# Patient Record
Sex: FEMALE | Race: WHITE | NOT HISPANIC OR LATINO | Employment: FULL TIME | URBAN - NONMETROPOLITAN AREA
[De-identification: names, ages, dates, MRNs, and addresses within clinical notes are randomized per-mention and may not be internally consistent; named-entity substitution may affect disease eponyms.]

---

## 2022-02-21 ENCOUNTER — APPOINTMENT (OUTPATIENT)
Dept: CT IMAGING | Facility: HOSPITAL | Age: 47
End: 2022-02-21

## 2022-02-21 ENCOUNTER — HOSPITAL ENCOUNTER (EMERGENCY)
Facility: HOSPITAL | Age: 47
Discharge: HOME | End: 2022-02-21
Attending: STUDENT IN AN ORGANIZED HEALTH CARE EDUCATION/TRAINING PROGRAM | Admitting: STUDENT IN AN ORGANIZED HEALTH CARE EDUCATION/TRAINING PROGRAM

## 2022-02-21 ENCOUNTER — APPOINTMENT (OUTPATIENT)
Dept: GENERAL RADIOLOGY | Facility: HOSPITAL | Age: 47
End: 2022-02-21

## 2022-02-21 DIAGNOSIS — R55 SYNCOPE AND COLLAPSE: Primary | ICD-10-CM

## 2022-02-21 DIAGNOSIS — S01.112A LACERATION OF LEFT EYEBROW, INITIAL ENCOUNTER: ICD-10-CM

## 2022-02-21 LAB
ALBUMIN SERPL-MCNC: 4.02 G/DL (ref 3.5–5.2)
ALBUMIN/GLOB SERPL: 1.4 G/DL
ALP SERPL-CCNC: 46 U/L (ref 39–117)
ALT SERPL W P-5'-P-CCNC: 9 U/L (ref 1–33)
ANION GAP SERPL CALCULATED.3IONS-SCNC: 10.5 MMOL/L (ref 5–15)
AST SERPL-CCNC: 14 U/L (ref 1–32)
B-HCG UR QL: NEGATIVE
BACTERIA UR QL AUTO: ABNORMAL /HPF
BASOPHILS # BLD AUTO: 0.05 10*3/MM3 (ref 0–0.2)
BASOPHILS NFR BLD AUTO: 0.5 % (ref 0–1.5)
BILIRUB SERPL-MCNC: 0.2 MG/DL (ref 0–1.2)
BILIRUB UR QL STRIP: NEGATIVE
BUN SERPL-MCNC: 7 MG/DL (ref 6–20)
BUN/CREAT SERPL: 9.5 (ref 7–25)
CALCIUM SPEC-SCNC: 8.7 MG/DL (ref 8.6–10.5)
CHLORIDE SERPL-SCNC: 105 MMOL/L (ref 98–107)
CLARITY UR: ABNORMAL
CO2 SERPL-SCNC: 23.5 MMOL/L (ref 22–29)
COLOR UR: YELLOW
CREAT SERPL-MCNC: 0.74 MG/DL (ref 0.57–1)
D DIMER PPP FEU-MCNC: 0.31 MCGFEU/ML (ref 0–0.5)
DEPRECATED RDW RBC AUTO: 44.5 FL (ref 37–54)
EOSINOPHIL # BLD AUTO: 0.51 10*3/MM3 (ref 0–0.4)
EOSINOPHIL NFR BLD AUTO: 4.9 % (ref 0.3–6.2)
ERYTHROCYTE [DISTWIDTH] IN BLOOD BY AUTOMATED COUNT: 13 % (ref 12.3–15.4)
FLUAV RNA RESP QL NAA+PROBE: NOT DETECTED
FLUBV RNA RESP QL NAA+PROBE: NOT DETECTED
GFR SERPL CREATININE-BSD FRML MDRD: 85 ML/MIN/1.73
GLOBULIN UR ELPH-MCNC: 2.8 GM/DL
GLUCOSE SERPL-MCNC: 94 MG/DL (ref 65–99)
GLUCOSE UR STRIP-MCNC: NEGATIVE MG/DL
HCT VFR BLD AUTO: 41.8 % (ref 34–46.6)
HGB BLD-MCNC: 13.8 G/DL (ref 12–15.9)
HGB UR QL STRIP.AUTO: ABNORMAL
HYALINE CASTS UR QL AUTO: ABNORMAL /LPF
IMM GRANULOCYTES # BLD AUTO: 0.03 10*3/MM3 (ref 0–0.05)
IMM GRANULOCYTES NFR BLD AUTO: 0.3 % (ref 0–0.5)
KETONES UR QL STRIP: NEGATIVE
LEUKOCYTE ESTERASE UR QL STRIP.AUTO: ABNORMAL
LYMPHOCYTES # BLD AUTO: 2.73 10*3/MM3 (ref 0.7–3.1)
LYMPHOCYTES NFR BLD AUTO: 26.5 % (ref 19.6–45.3)
MCH RBC QN AUTO: 30.6 PG (ref 26.6–33)
MCHC RBC AUTO-ENTMCNC: 33 G/DL (ref 31.5–35.7)
MCV RBC AUTO: 92.7 FL (ref 79–97)
MONOCYTES # BLD AUTO: 0.6 10*3/MM3 (ref 0.1–0.9)
MONOCYTES NFR BLD AUTO: 5.8 % (ref 5–12)
NEUTROPHILS NFR BLD AUTO: 6.4 10*3/MM3 (ref 1.7–7)
NEUTROPHILS NFR BLD AUTO: 62 % (ref 42.7–76)
NITRITE UR QL STRIP: NEGATIVE
NRBC BLD AUTO-RTO: 0 /100 WBC (ref 0–0.2)
NT-PROBNP SERPL-MCNC: 63.4 PG/ML (ref 0–450)
PH UR STRIP.AUTO: 7 [PH] (ref 5–8)
PLATELET # BLD AUTO: 301 10*3/MM3 (ref 140–450)
PMV BLD AUTO: 10.7 FL (ref 6–12)
POTASSIUM SERPL-SCNC: 4 MMOL/L (ref 3.5–5.2)
PROT SERPL-MCNC: 6.8 G/DL (ref 6–8.5)
PROT UR QL STRIP: NEGATIVE
RBC # BLD AUTO: 4.51 10*6/MM3 (ref 3.77–5.28)
RBC # UR STRIP: ABNORMAL /HPF
REF LAB TEST METHOD: ABNORMAL
SARS-COV-2 RNA RESP QL NAA+PROBE: NOT DETECTED
SODIUM SERPL-SCNC: 139 MMOL/L (ref 136–145)
SP GR UR STRIP: 1.01 (ref 1–1.03)
SQUAMOUS #/AREA URNS HPF: ABNORMAL /HPF
TROPONIN T SERPL-MCNC: <0.01 NG/ML (ref 0–0.03)
TROPONIN T SERPL-MCNC: <0.01 NG/ML (ref 0–0.03)
UROBILINOGEN UR QL STRIP: ABNORMAL
WBC # UR STRIP: ABNORMAL /HPF
WBC NRBC COR # BLD: 10.32 10*3/MM3 (ref 3.4–10.8)

## 2022-02-21 PROCEDURE — 87636 SARSCOV2 & INF A&B AMP PRB: CPT | Performed by: PHYSICIAN ASSISTANT

## 2022-02-21 PROCEDURE — 93010 ELECTROCARDIOGRAM REPORT: CPT | Performed by: INTERNAL MEDICINE

## 2022-02-21 PROCEDURE — 36415 COLL VENOUS BLD VENIPUNCTURE: CPT

## 2022-02-21 PROCEDURE — 72125 CT NECK SPINE W/O DYE: CPT

## 2022-02-21 PROCEDURE — 70486 CT MAXILLOFACIAL W/O DYE: CPT

## 2022-02-21 PROCEDURE — 99283 EMERGENCY DEPT VISIT LOW MDM: CPT

## 2022-02-21 PROCEDURE — 80053 COMPREHEN METABOLIC PANEL: CPT | Performed by: PHYSICIAN ASSISTANT

## 2022-02-21 PROCEDURE — 81025 URINE PREGNANCY TEST: CPT | Performed by: PHYSICIAN ASSISTANT

## 2022-02-21 PROCEDURE — 93005 ELECTROCARDIOGRAM TRACING: CPT | Performed by: EMERGENCY MEDICINE

## 2022-02-21 PROCEDURE — 83880 ASSAY OF NATRIURETIC PEPTIDE: CPT | Performed by: PHYSICIAN ASSISTANT

## 2022-02-21 PROCEDURE — 87086 URINE CULTURE/COLONY COUNT: CPT | Performed by: PHYSICIAN ASSISTANT

## 2022-02-21 PROCEDURE — 70450 CT HEAD/BRAIN W/O DYE: CPT

## 2022-02-21 PROCEDURE — 84484 ASSAY OF TROPONIN QUANT: CPT | Performed by: PHYSICIAN ASSISTANT

## 2022-02-21 PROCEDURE — 85025 COMPLETE CBC W/AUTO DIFF WBC: CPT | Performed by: PHYSICIAN ASSISTANT

## 2022-02-21 PROCEDURE — 71045 X-RAY EXAM CHEST 1 VIEW: CPT | Performed by: RADIOLOGY

## 2022-02-21 PROCEDURE — 71045 X-RAY EXAM CHEST 1 VIEW: CPT

## 2022-02-21 PROCEDURE — 85379 FIBRIN DEGRADATION QUANT: CPT | Performed by: PHYSICIAN ASSISTANT

## 2022-02-21 PROCEDURE — 93005 ELECTROCARDIOGRAM TRACING: CPT | Performed by: STUDENT IN AN ORGANIZED HEALTH CARE EDUCATION/TRAINING PROGRAM

## 2022-02-21 PROCEDURE — 81001 URINALYSIS AUTO W/SCOPE: CPT | Performed by: PHYSICIAN ASSISTANT

## 2022-02-21 RX ORDER — CEPHALEXIN 500 MG/1
500 CAPSULE ORAL 2 TIMES DAILY
Qty: 20 CAPSULE | Refills: 0 | Status: SHIPPED | OUTPATIENT
Start: 2022-02-21 | End: 2023-02-13

## 2022-02-22 VITALS
OXYGEN SATURATION: 99 % | BODY MASS INDEX: 33.34 KG/M2 | TEMPERATURE: 98.4 F | RESPIRATION RATE: 18 BRPM | SYSTOLIC BLOOD PRESSURE: 131 MMHG | HEART RATE: 77 BPM | HEIGHT: 68 IN | DIASTOLIC BLOOD PRESSURE: 83 MMHG | WEIGHT: 220 LBS

## 2022-02-22 LAB
BACTERIA SPEC AEROBE CULT: NORMAL
QT INTERVAL: 374 MS
QTC INTERVAL: 434 MS

## 2022-02-22 NOTE — ED PROVIDER NOTES
Subjective     History provided by:  Patient  Syncope  Episode history:  Single  Most recent episode:  Yesterday  Timing:  Rare  Progression:  Improving  Chronicity:  New  Context: sitting down and standing up    Witnessed: yes    Relieved by:  Sitting up  Worsened by:  Nothing  Ineffective treatments:  None tried  Associated symptoms: no chest pain and no fever        Review of Systems   Constitutional: Negative.  Negative for fever.   HENT: Negative.    Respiratory: Negative.    Cardiovascular: Positive for syncope. Negative for chest pain.   Gastrointestinal: Negative.  Negative for abdominal pain.   Endocrine: Negative.    Genitourinary: Negative.  Negative for dysuria.   Skin: Positive for wound.   Neurological: Positive for syncope and light-headedness.   Psychiatric/Behavioral: Negative.    All other systems reviewed and are negative.      No past medical history on file.    Allergies   Allergen Reactions   • Ibuprofen Anaphylaxis       No past surgical history on file.    No family history on file.    Social History     Socioeconomic History   • Marital status: Single           Objective   Physical Exam  Vitals and nursing note reviewed.   Constitutional:       General: She is not in acute distress.     Appearance: She is well-developed. She is not diaphoretic.   HENT:      Head: Normocephalic.      Comments: Laceration to the left eyebrow.  Laceration is greater than 8 hours old, and has closed quite well.       Right Ear: External ear normal.      Left Ear: External ear normal.      Nose: Nose normal.   Eyes:      Extraocular Movements: Extraocular movements intact.      Conjunctiva/sclera: Conjunctivae normal.      Pupils: Pupils are equal, round, and reactive to light.   Neck:      Vascular: No JVD.      Trachea: No tracheal deviation.   Cardiovascular:      Rate and Rhythm: Normal rate and regular rhythm.      Heart sounds: Normal heart sounds. No murmur heard.      Pulmonary:      Effort: Pulmonary  effort is normal. No respiratory distress.      Breath sounds: Normal breath sounds. No wheezing.   Abdominal:      Palpations: Abdomen is soft.      Tenderness: There is no abdominal tenderness.   Musculoskeletal:         General: No deformity. Normal range of motion.      Cervical back: Normal range of motion and neck supple.   Skin:     General: Skin is warm and dry.      Coloration: Skin is not pale.      Findings: No erythema or rash.   Neurological:      Mental Status: She is alert and oriented to person, place, and time.      Cranial Nerves: No cranial nerve deficit.   Psychiatric:         Behavior: Behavior normal.         Thought Content: Thought content normal.         Procedures           ED Course  ED Course as of 02/21/22 2343   Mon Feb 21, 2022   2147 CT head rad interpreted:  .  No acute intracranial abnormality.  2.  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 [RB]   2215 XR chest rad interpreted:  No evidence of active or acute cardiopulmonary disease on today's chest  radiograph. [RB]   2341 CT cervical spine / facial bones rad interpreted:  1.  Minimal nasal bone deformity is age indeterminate.  2.  Variable states of dental decay.  3.  No acute bony abnormality in the cervical spine.    [RB]      ED Course User Index  [RB] Jeremie Van II, PA                                                 MDM  Number of Diagnoses or Management Options  Laceration of left eyebrow, initial encounter: new and requires workup  Syncope and collapse: new and requires workup     Amount and/or Complexity of Data Reviewed  Clinical lab tests: ordered and reviewed  Tests in the radiology section of CPT®: ordered and reviewed    Risk of Complications, Morbidity, and/or Mortality  Presenting problems: moderate  Diagnostic procedures: moderate  Management options: low    Patient Progress  Patient progress: stable      Final diagnoses:   Syncope and collapse    Laceration of left eyebrow, initial encounter       ED Disposition  ED Disposition     ED Disposition Condition Comment    Discharge Stable           Zachary Mckeon, APRN  [address] [place], [place] [address]  [phone]    Schedule an appointment as soon as possible for a visit            Medication List      New Prescriptions    cephalexin 500 MG capsule  Commonly known as: KEFLEX  Take 1 capsule by mouth 2 (Two) Times a Day.           Where to Get Your Medications      You can get these medications from any pharmacy    Bring a paper prescription for each of these medications  · cephalexin 500 MG capsule          Jeremie Van II, PA  02/21/22 8431

## 2022-02-22 NOTE — ED NOTES
Pt reassessed at this time, pt has no new complaints. Apologized to pt for wait times. Pt has NADN at this time. Will continue to monitor pt.        Annabella Joseph RN  02/21/22 [id number]

## 2022-04-27 ENCOUNTER — TRANSCRIBE ORDERS (OUTPATIENT)
Dept: ADMINISTRATIVE | Facility: HOSPITAL | Age: 47
End: 2022-04-27

## 2022-04-27 ENCOUNTER — HOSPITAL ENCOUNTER (OUTPATIENT)
Dept: GENERAL RADIOLOGY | Facility: HOSPITAL | Age: 47
Discharge: HOME | End: 2022-04-27

## 2022-04-27 DIAGNOSIS — M25.571 RIGHT ANKLE PAIN, UNSPECIFIED CHRONICITY: ICD-10-CM

## 2022-04-27 DIAGNOSIS — M79.671 RIGHT FOOT PAIN: ICD-10-CM

## 2022-04-27 DIAGNOSIS — M25.571 RIGHT ANKLE PAIN, UNSPECIFIED CHRONICITY: Primary | ICD-10-CM

## 2022-04-27 PROCEDURE — 73630 X-RAY EXAM OF FOOT: CPT

## 2022-04-27 PROCEDURE — 73630 X-RAY EXAM OF FOOT: CPT | Performed by: RADIOLOGY

## 2022-04-27 PROCEDURE — 73610 X-RAY EXAM OF ANKLE: CPT

## 2022-04-27 PROCEDURE — 73610 X-RAY EXAM OF ANKLE: CPT | Performed by: RADIOLOGY

## 2022-10-18 ENCOUNTER — TRANSCRIBE ORDERS (OUTPATIENT)
Dept: ADMINISTRATIVE | Facility: HOSPITAL | Age: 47
End: 2022-10-18

## 2022-10-18 DIAGNOSIS — E04.0 NONTOXIC (DIFFUSE) GOITER: ICD-10-CM

## 2022-10-18 DIAGNOSIS — R10.11 ABDOMINAL PAIN, RIGHT UPPER QUADRANT: Primary | ICD-10-CM

## 2022-10-26 ENCOUNTER — HOSPITAL ENCOUNTER (OUTPATIENT)
Dept: ULTRASOUND IMAGING | Facility: HOSPITAL | Age: 47
Discharge: HOME | End: 2022-10-26

## 2022-10-26 ENCOUNTER — TRANSCRIBE ORDERS (OUTPATIENT)
Dept: ADMINISTRATIVE | Facility: HOSPITAL | Age: 47
End: 2022-10-26

## 2022-10-26 ENCOUNTER — LAB (OUTPATIENT)
Dept: LAB | Facility: HOSPITAL | Age: 47
End: 2022-10-26

## 2022-10-26 DIAGNOSIS — R55 SYNCOPE AND COLLAPSE: Primary | ICD-10-CM

## 2022-10-26 DIAGNOSIS — R10.11 ABDOMINAL PAIN, RIGHT UPPER QUADRANT: ICD-10-CM

## 2022-10-26 DIAGNOSIS — E04.0 NONTOXIC (DIFFUSE) GOITER: ICD-10-CM

## 2022-10-26 DIAGNOSIS — R55 SYNCOPE AND COLLAPSE: ICD-10-CM

## 2022-10-26 LAB
ALBUMIN SERPL-MCNC: 4.1 G/DL (ref 3.5–5.2)
ALBUMIN/GLOB SERPL: 1.7 G/DL
ALP SERPL-CCNC: 44 U/L (ref 39–117)
ALT SERPL W P-5'-P-CCNC: 10 U/L (ref 1–33)
ANION GAP SERPL CALCULATED.3IONS-SCNC: 8.1 MMOL/L (ref 5–15)
AST SERPL-CCNC: 17 U/L (ref 1–32)
BASOPHILS # BLD AUTO: 0.03 10*3/MM3 (ref 0–0.2)
BASOPHILS NFR BLD AUTO: 0.2 % (ref 0–1.5)
BILIRUB SERPL-MCNC: 0.5 MG/DL (ref 0–1.2)
BUN SERPL-MCNC: 5 MG/DL (ref 6–20)
BUN/CREAT SERPL: 6.9 (ref 7–25)
CALCIUM SPEC-SCNC: 8.8 MG/DL (ref 8.6–10.5)
CHLORIDE SERPL-SCNC: 106 MMOL/L (ref 98–107)
CO2 SERPL-SCNC: 27.9 MMOL/L (ref 22–29)
CREAT SERPL-MCNC: 0.72 MG/DL (ref 0.57–1)
DEPRECATED RDW RBC AUTO: 39.5 FL (ref 37–54)
EGFRCR SERPLBLD CKD-EPI 2021: 105.9 ML/MIN/1.73
EOSINOPHIL # BLD AUTO: 0.35 10*3/MM3 (ref 0–0.4)
EOSINOPHIL NFR BLD AUTO: 2.7 % (ref 0.3–6.2)
ERYTHROCYTE [DISTWIDTH] IN BLOOD BY AUTOMATED COUNT: 12 % (ref 12.3–15.4)
GLOBULIN UR ELPH-MCNC: 2.4 GM/DL
GLUCOSE SERPL-MCNC: 84 MG/DL (ref 65–99)
HCT VFR BLD AUTO: 41.2 % (ref 34–46.6)
HGB BLD-MCNC: 14.1 G/DL (ref 12–15.9)
IMM GRANULOCYTES # BLD AUTO: 0.04 10*3/MM3 (ref 0–0.05)
IMM GRANULOCYTES NFR BLD AUTO: 0.3 % (ref 0–0.5)
LYMPHOCYTES # BLD AUTO: 1.8 10*3/MM3 (ref 0.7–3.1)
LYMPHOCYTES NFR BLD AUTO: 13.9 % (ref 19.6–45.3)
MCH RBC QN AUTO: 31.2 PG (ref 26.6–33)
MCHC RBC AUTO-ENTMCNC: 34.2 G/DL (ref 31.5–35.7)
MCV RBC AUTO: 91.2 FL (ref 79–97)
MONOCYTES # BLD AUTO: 0.57 10*3/MM3 (ref 0.1–0.9)
MONOCYTES NFR BLD AUTO: 4.4 % (ref 5–12)
NEUTROPHILS NFR BLD AUTO: 10.18 10*3/MM3 (ref 1.7–7)
NEUTROPHILS NFR BLD AUTO: 78.5 % (ref 42.7–76)
NRBC BLD AUTO-RTO: 0 /100 WBC (ref 0–0.2)
PLATELET # BLD AUTO: 294 10*3/MM3 (ref 140–450)
PMV BLD AUTO: 11.5 FL (ref 6–12)
POTASSIUM SERPL-SCNC: 4.1 MMOL/L (ref 3.5–5.2)
PROT SERPL-MCNC: 6.5 G/DL (ref 6–8.5)
RBC # BLD AUTO: 4.52 10*6/MM3 (ref 3.77–5.28)
SODIUM SERPL-SCNC: 142 MMOL/L (ref 136–145)
TSH SERPL DL<=0.05 MIU/L-ACNC: 0.56 UIU/ML (ref 0.27–4.2)
WBC NRBC COR # BLD: 12.97 10*3/MM3 (ref 3.4–10.8)

## 2022-10-26 PROCEDURE — 86376 MICROSOMAL ANTIBODY EACH: CPT

## 2022-10-26 PROCEDURE — 76536 US EXAM OF HEAD AND NECK: CPT | Performed by: RADIOLOGY

## 2022-10-26 PROCEDURE — 76705 ECHO EXAM OF ABDOMEN: CPT

## 2022-10-26 PROCEDURE — 80050 GENERAL HEALTH PANEL: CPT

## 2022-10-26 PROCEDURE — 76536 US EXAM OF HEAD AND NECK: CPT

## 2022-10-26 PROCEDURE — 36415 COLL VENOUS BLD VENIPUNCTURE: CPT

## 2022-10-26 PROCEDURE — 76705 ECHO EXAM OF ABDOMEN: CPT | Performed by: RADIOLOGY

## 2022-10-27 LAB — THYROPEROXIDASE AB SERPL-ACNC: <8 IU/ML (ref 0–34)

## 2023-02-13 ENCOUNTER — LAB (OUTPATIENT)
Dept: LAB | Facility: HOSPITAL | Age: 48
End: 2023-02-13
Payer: COMMERCIAL

## 2023-02-13 ENCOUNTER — OFFICE VISIT (OUTPATIENT)
Dept: ENDOCRINOLOGY | Facility: CLINIC | Age: 48
End: 2023-02-13
Payer: COMMERCIAL

## 2023-02-13 VITALS
OXYGEN SATURATION: 97 % | BODY MASS INDEX: 31.39 KG/M2 | SYSTOLIC BLOOD PRESSURE: 142 MMHG | DIASTOLIC BLOOD PRESSURE: 86 MMHG | HEIGHT: 70 IN | WEIGHT: 219.3 LBS | HEART RATE: 120 BPM

## 2023-02-13 DIAGNOSIS — E04.2 MULTIPLE THYROID NODULES: Primary | ICD-10-CM

## 2023-02-13 LAB
T3FREE SERPL-MCNC: 2.95 PG/ML (ref 2–4.4)
T4 FREE SERPL-MCNC: 1.31 NG/DL (ref 0.93–1.7)
TSH SERPL DL<=0.05 MIU/L-ACNC: 0.62 UIU/ML (ref 0.27–4.2)

## 2023-02-13 PROCEDURE — 84439 ASSAY OF FREE THYROXINE: CPT | Performed by: NURSE PRACTITIONER

## 2023-02-13 PROCEDURE — 36415 COLL VENOUS BLD VENIPUNCTURE: CPT | Performed by: NURSE PRACTITIONER

## 2023-02-13 PROCEDURE — 84481 FREE ASSAY (FT-3): CPT | Performed by: NURSE PRACTITIONER

## 2023-02-13 PROCEDURE — 99203 OFFICE O/P NEW LOW 30 MIN: CPT | Performed by: NURSE PRACTITIONER

## 2023-02-13 PROCEDURE — 84443 ASSAY THYROID STIM HORMONE: CPT | Performed by: NURSE PRACTITIONER

## 2023-02-13 NOTE — PROGRESS NOTES
Chief Complaint   Patient presents with   • Goiter     Patient stated she has issues swallowing sometimes. Pt stated her weight goes up and down. Pt said she can sleep 12 hours and still feels like she hasn't slept at all.         Referring Provider  Dara Guevara PA     HPI   Savita Lepe is a 47 y.o. female had concerns including Goiter (Patient stated she has issues swallowing sometimes. Pt stated her weight goes up and down. Pt said she can sleep 12 hours and still feels like she hasn't slept at all. ).   Seen as a new patient.  Thyroid Goiter.    She has been having increased compressive s/sx's.  She feels like at night she does have trouble breathing sometimes.  She has trouble swallowing bigger pills and dry foods.    Birth state: [place]  Previous history of radiation to face/neck: none  Consuming foods high in iodine: none  Family history of thyroid complications: father-thyroidectomy-goiter    TFT's in 10/2022 were in range.  TSH: 0.557  TPO: 9    US Thyroid 10/24/2022  FINDINGS:    Soft tissues:  Unremarkable.  No abscess.  No foreign body.    Left thyroid lobe:  Left lobe of thyroid is 2.2 x 7.2 x 2.1 cm.  Ill-defined mixed cystic and solid nodule left lobe is 2.8 cm.  Smaller  colloid cyst of the left lobe is noted that is approximately 1.4 cm.    Right thyroid lobe:  Right lobe of thyroid is 1.9 x 6.1 x 1.3 cm.  Mixed echogenicity nodule is noted in the lower pole that is 1.2 x 0.9  cm May represent colloid cyst. An adjacent smaller nodule is noted that  is approximately 1.0 cm.    Isthmus:  Isthmus is 4 mm in thickness.    Lymph nodes:  No cervical lymphadenopathy is identified.     IMPRESSION:  1.  Heterogeneous enlarged thyroid gland with multiple cysts and/or  nodules compatible with multinodular thyroid goiter.  2.  Largest index nodules of both lobes as detailed above.  3.  Otherwise unremarkable exam.    The following portions of the patient's history were reviewed and updated as appropriate:  "allergies, current medications, past family history, past medical history, past social history, past surgical history and problem list.      No past medical history on file.  No past surgical history on file.   No family history on file.   Social History     Socioeconomic History   • Marital status: Single   Tobacco Use   • Smoking status: Every Day     Packs/day: 0.50     Types: Cigarettes   • Smokeless tobacco: Never   Substance and Sexual Activity   • Alcohol use: Never   • Drug use: Never   • Sexual activity: Defer      Allergies   Allergen Reactions   • Ibuprofen Anaphylaxis      Current Outpatient Medications on File Prior to Visit   Medication Sig Dispense Refill   • [DISCONTINUED] cephalexin (KEFLEX) 500 MG capsule Take 1 capsule by mouth 2 (Two) Times a Day. 20 capsule 0     No current facility-administered medications on file prior to visit.      Review of Systems   Constitutional: Positive for fatigue, unexpected weight gain and unexpected weight loss.   Eyes: Positive for blurred vision.   Endocrine: Positive for cold intolerance and heat intolerance.   Skin: Positive for dry skin.        Hair thinning/hair loss   Psychiatric/Behavioral: Negative for sleep disturbance.   All other systems reviewed and are negative.     /86 (BP Location: Left arm, Patient Position: Sitting, Cuff Size: Adult)   Pulse 120   Ht 177.8 cm (70\")   Wt 99.5 kg (219 lb 4.8 oz)   SpO2 97%   BMI 31.47 kg/m²      Physical Exam  Vitals reviewed.   Constitutional:       Appearance: Normal appearance.   Eyes:      Extraocular Movements: Extraocular movements intact.   Neck:      Thyroid: Thyroid mass, thyromegaly and thyroid tenderness present.   Cardiovascular:      Rate and Rhythm: Normal rate.   Pulmonary:      Effort: Pulmonary effort is normal.   Skin:     General: Skin is warm.   Neurological:      General: No focal deficit present.      Mental Status: She is alert and oriented to person, place, and time.   Psychiatric: "         Mood and Affect: Mood normal.         Behavior: Behavior normal.         Thought Content: Thought content normal.         Judgment: Judgment normal.       Labs/Imaging  CMP  Lab Results   Component Value Date    GLUCOSE 84 10/26/2022    BUN 5 (L) 10/26/2022    CREATININE 0.72 10/26/2022    EGFRIFNONA 85 02/21/2022    BCR 6.9 (L) 10/26/2022    K 4.1 10/26/2022    CO2 27.9 10/26/2022    CALCIUM 8.8 10/26/2022    ALBUMIN 4.10 10/26/2022    LABIL2 2.4 12/18/2014    AST 17 10/26/2022    ALT 10 10/26/2022        CBC w/DIFF   Lab Results   Component Value Date    WBC 12.97 (H) 10/26/2022    RBC 4.52 10/26/2022    HGB 14.1 10/26/2022    HCT 41.2 10/26/2022    MCV 91.2 10/26/2022    MCH 31.2 10/26/2022    MCHC 34.2 10/26/2022    RDW 12.0 (L) 10/26/2022    RDWSD 39.5 10/26/2022    MPV 11.5 10/26/2022     10/26/2022    NEUTRORELPCT 78.5 (H) 10/26/2022    LYMPHORELPCT 13.9 (L) 10/26/2022    MONORELPCT 4.4 (L) 10/26/2022    EOSRELPCT 2.7 10/26/2022    BASORELPCT 0.2 10/26/2022    AUTOIGPER 0.3 10/26/2022    NEUTROABS 10.18 (H) 10/26/2022    LYMPHSABS 1.80 10/26/2022    MONOSABS 0.57 10/26/2022    EOSABS 0.35 10/26/2022    BASOSABS 0.03 10/26/2022    AUTOIGNUM 0.04 10/26/2022    NRBC 0.0 10/26/2022       TSH  Lab Results   Component Value Date    TSH 0.557 10/26/2022       T4  No results found for: FREET4  No results found for: B6KFJLD    T3  No results found for: T3FREE  No results found for: K8YGZVC    TRAb  No results found for: TSURCPAB    TPO  Lab Results   Component Value Date    THYROIDAB <8 10/26/2022       No valid procedures specified.    Assessment and Plan    Diagnoses and all orders for this visit:    1. Multiple thyroid nodules (Primary)  Assessment & Plan:  Discussed US findings with patient and family.  Will obtain TFT's to reassess function of the thyroid.  She would like to think about biopsy of the 2.8 cm nodule vs removal and will notify which she decides.  Follow-up in 4 months.    Orders:  -      TSH  -     T4, Free  -     T3, Free       Return in about 4 months (around 6/13/2023) for Follow-up appointment. The patient was instructed to contact the clinic with any interval questions or concerns.      This document has been electronically signed by ALIA Clinton  February 13, 2023 10:50 EST   Endocrinology

## 2023-02-13 NOTE — ASSESSMENT & PLAN NOTE
Discussed US findings with patient and family.  Will obtain TFT's to reassess function of the thyroid.  She would like to think about biopsy of the 2.8 cm nodule vs removal and will notify which she decides.  Follow-up in 4 months.

## 2023-02-14 DIAGNOSIS — E04.2 MULTIPLE THYROID NODULES: Primary | ICD-10-CM

## 2023-06-30 PROBLEM — E04.1 THYROID NODULE: Status: ACTIVE | Noted: 2023-06-30

## 2023-07-24 ENCOUNTER — OFFICE VISIT (OUTPATIENT)
Dept: ENDOCRINOLOGY | Facility: CLINIC | Age: 48
End: 2023-07-24
Payer: COMMERCIAL

## 2023-07-24 VITALS
DIASTOLIC BLOOD PRESSURE: 90 MMHG | BODY MASS INDEX: 31.77 KG/M2 | HEIGHT: 68 IN | HEART RATE: 85 BPM | OXYGEN SATURATION: 96 % | SYSTOLIC BLOOD PRESSURE: 128 MMHG | WEIGHT: 209.6 LBS

## 2023-07-24 DIAGNOSIS — E89.0 POSTSURGICAL HYPOTHYROIDISM: Primary | ICD-10-CM

## 2023-07-24 LAB
CALCIUM SPEC-SCNC: 9 MG/DL (ref 8.6–10.5)
T4 FREE SERPL-MCNC: 1.98 NG/DL (ref 0.93–1.7)
TSH SERPL DL<=0.05 MIU/L-ACNC: 0.51 UIU/ML (ref 0.27–4.2)

## 2023-07-24 PROCEDURE — 99213 OFFICE O/P EST LOW 20 MIN: CPT | Performed by: NURSE PRACTITIONER

## 2023-07-24 PROCEDURE — 84439 ASSAY OF FREE THYROXINE: CPT | Performed by: NURSE PRACTITIONER

## 2023-07-24 PROCEDURE — 84443 ASSAY THYROID STIM HORMONE: CPT | Performed by: NURSE PRACTITIONER

## 2023-07-24 PROCEDURE — 82310 ASSAY OF CALCIUM: CPT | Performed by: NURSE PRACTITIONER

## 2023-07-24 NOTE — PROGRESS NOTES
Chief Complaint   Patient presents with    Post-op     Lip tingling, leg tremor, hand and feet tingling. Pt puked on Wednesday. Headache for the past 2 days.        Referring Provider  No ref. provider found     HPI   Savita Lepe is a 47 y.o. female had concerns including Post-op (Lip tingling, leg tremor, hand and feet tingling. Pt puked on Wednesday. Headache for the past 2 days.).   Thyroid Goiter.    She has a total thyroidectomy on 6/30/2023.  She followed up with Dr. Garrett and was noted to have a benign pathology.  She is here today with c/o tingling in her lips, hand and feet.  She also states that she has a tremor.  She has also noted a recent headache and some nausea as well.  She is currently taking Calcitriol 0.5 mcg  BID, T4 150 mcg QD, Calcium Carb 500 mg 1000 mg TID.    Birth state: [place]  Previous history of radiation to face/neck: none  Consuming foods high in iodine: none  Family history of thyroid complications: father-thyroidectomy-goiter    TFT's in 10/2022 were in range.  TSH: 0.557  TPO: 9    US Thyroid 10/24/2022  FINDINGS:    Soft tissues:  Unremarkable.  No abscess.  No foreign body.    Left thyroid lobe:  Left lobe of thyroid is 2.2 x 7.2 x 2.1 cm.  Ill-defined mixed cystic and solid nodule left lobe is 2.8 cm.  Smaller  colloid cyst of the left lobe is noted that is approximately 1.4 cm.    Right thyroid lobe:  Right lobe of thyroid is 1.9 x 6.1 x 1.3 cm.  Mixed echogenicity nodule is noted in the lower pole that is 1.2 x 0.9  cm May represent colloid cyst. An adjacent smaller nodule is noted that  is approximately 1.0 cm.    Isthmus:  Isthmus is 4 mm in thickness.    Lymph nodes:  No cervical lymphadenopathy is identified.     IMPRESSION:  1.  Heterogeneous enlarged thyroid gland with multiple cysts and/or  nodules compatible with multinodular thyroid goiter.  2.  Largest index nodules of both lobes as detailed above.  3.  Otherwise unremarkable exam.    The following portions of the  patient's history were reviewed and updated as appropriate: allergies, current medications, past family history, past medical history, past social history, past surgical history and problem list.      Past Medical History:   Diagnosis Date    Disease of thyroid gland      Past Surgical History:   Procedure Laterality Date    BLADDER SURGERY      as a child    THYROIDECTOMY Bilateral 6/30/2023    Procedure: TOTAL THYROIDECTOMY;  Surgeon: Jamey Garrett MD;  Location: [facility];  Service: General;  Laterality: Bilateral;    WISDOM TOOTH EXTRACTION        No family history on file.   Social History     Socioeconomic History    Marital status: Single   Tobacco Use    Smoking status: Former     Packs/day: 1.00     Years: 20.00     Pack years: 20.00     Types: Cigarettes    Smokeless tobacco: Never    Tobacco comments:     Nicotine Patch, trying to quit   Vaping Use    Vaping Use: Former    Substances: Nicotine   Substance and Sexual Activity    Alcohol use: Never    Drug use: Never    Sexual activity: Defer      Allergies   Allergen Reactions    Ibuprofen Anaphylaxis      Current Outpatient Medications on File Prior to Visit   Medication Sig Dispense Refill    calcitriol (ROCALTROL) 0.5 MCG capsule Take 1 capsule by mouth Every 12 (Twelve) Hours for 90 days. 60 capsule 2    calcium carbonate (TUMS) 500 MG chewable tablet Chew 2 tablets 3 (Three) Times a Day for 90 days. 180 tablet 2    docusate sodium (COLACE) 100 MG capsule Take 1 capsule by mouth 2 (Two) Times a Day As Needed for Constipation. 30 capsule 0    levothyroxine (SYNTHROID, LEVOTHROID) 150 MCG tablet Take 1 tablet by mouth Every Morning for 90 days. 30 tablet 2    nicotine (NICODERM CQ) 21 MG/24HR patch Place 1 patch on the skin as directed by provider Daily.      oxyCODONE-acetaminophen (Percocet) 5-325 MG per tablet Take 1 tablet by mouth Every 4 (Four) Hours As Needed for Moderate Pain. 10 tablet 0     No current facility-administered medications on  "file prior to visit.      Review of Systems   Constitutional:  Positive for fatigue, unexpected weight gain and unexpected weight loss.   Eyes:  Positive for blurred vision.   Endocrine: Positive for cold intolerance and heat intolerance.   Skin:  Positive for dry skin.        Hair thinning/hair loss   Psychiatric/Behavioral:  Negative for sleep disturbance.    All other systems reviewed and are negative.     /90 (BP Location: Right arm, Patient Position: Sitting, Cuff Size: Adult)   Pulse 85   Ht 172.7 cm (68\")   Wt 95.1 kg (209 lb 9.6 oz)   SpO2 96%   BMI 31.87 kg/m²      Physical Exam  Vitals reviewed.   Constitutional:       Appearance: Normal appearance.   Eyes:      Extraocular Movements: Extraocular movements intact.   Neck:     Cardiovascular:      Rate and Rhythm: Normal rate.   Pulmonary:      Effort: Pulmonary effort is normal.   Skin:     General: Skin is warm.   Neurological:      General: No focal deficit present.      Mental Status: She is alert and oriented to person, place, and time.   Psychiatric:         Mood and Affect: Mood normal.         Behavior: Behavior normal.         Thought Content: Thought content normal.         Judgment: Judgment normal.     Labs/Imaging  CMP  Lab Results   Component Value Date    GLUCOSE 84 10/26/2022    BUN 5 (L) 10/26/2022    CREATININE 0.72 10/26/2022    EGFRIFNONA 85 02/21/2022    BCR 6.9 (L) 10/26/2022    K 4.1 10/26/2022    CO2 27.9 10/26/2022    CALCIUM 9.0 07/24/2023    ALBUMIN 4.10 10/26/2022    LABIL2 2.4 12/18/2014    AST 17 10/26/2022    ALT 10 10/26/2022        CBC w/DIFF   Lab Results   Component Value Date    WBC 10.25 06/23/2023    RBC 4.41 06/23/2023    HGB 13.9 06/23/2023    HCT 41.8 06/23/2023    MCV 94.8 06/23/2023    MCH 31.5 06/23/2023    MCHC 33.3 06/23/2023    RDW 11.9 (L) 06/23/2023    RDWSD 41.7 06/23/2023    MPV 11.0 06/23/2023     06/23/2023    NEUTRORELPCT 78.5 (H) 10/26/2022    LYMPHORELPCT 13.9 (L) 10/26/2022    " MONORELPCT 4.4 (L) 10/26/2022    EOSRELPCT 2.7 10/26/2022    BASORELPCT 0.2 10/26/2022    AUTOIGPER 0.3 10/26/2022    NEUTROABS 10.18 (H) 10/26/2022    LYMPHSABS 1.80 10/26/2022    MONOSABS 0.57 10/26/2022    EOSABS 0.35 10/26/2022    BASOSABS 0.03 10/26/2022    AUTOIGNUM 0.04 10/26/2022    NRBC 0.0 10/26/2022       TSH  Lab Results   Component Value Date    TSH 0.505 07/24/2023    TSH 0.191 (L) 07/17/2023    TSH 1.170 06/20/2023       T4  Lab Results   Component Value Date    FREET4 2.00 (H) 07/17/2023    FREET4 1.36 06/20/2023    FREET4 1.31 02/13/2023     No results found for: U8VRVOZ    T3  Lab Results   Component Value Date    T3FREE 2.95 02/13/2023     No results found for: A3RUCMT    TRAb  No results found for: TSURCPAB    TPO  Lab Results   Component Value Date    THYROIDAB <8 10/26/2022       No valid procedures specified.    Assessment and Plan    Diagnoses and all orders for this visit:    1. Postsurgical hypothyroidism (Primary)  Assessment & Plan:  -Clinically euthyroid.  -TFT's today with medication adjustment accordingly.  Reminded of proper administration including taking 7 pills per week on an empty stomach with no missed doses, waiting 30-60 minutes prior to other medications or food.  -Follow-up in 1 month.    Will obtain calcium labs as well and adjust medication accordingly.    Orders:  -     Calcium  -     TSH         Return in about 4 weeks (around 8/21/2023) for Follow-up appointment. The patient was instructed to contact the clinic with any interval questions or concerns.      This document has been electronically signed by ALIA Clinton  July 24, 2023 13:05 EDT   Endocrinology

## 2023-07-24 NOTE — ASSESSMENT & PLAN NOTE
-Clinically euthyroid.  -TFT's today with medication adjustment accordingly.  Reminded of proper administration including taking 7 pills per week on an empty stomach with no missed doses, waiting 30-60 minutes prior to other medications or food.  -Follow-up in 1 month.    Will obtain calcium labs as well and adjust medication accordingly.

## 2023-08-29 ENCOUNTER — OFFICE VISIT (OUTPATIENT)
Dept: ENDOCRINOLOGY | Facility: CLINIC | Age: 48
End: 2023-08-29
Payer: COMMERCIAL

## 2023-08-29 VITALS
SYSTOLIC BLOOD PRESSURE: 124 MMHG | WEIGHT: 212.8 LBS | BODY MASS INDEX: 32.25 KG/M2 | HEART RATE: 71 BPM | OXYGEN SATURATION: 99 % | DIASTOLIC BLOOD PRESSURE: 76 MMHG | HEIGHT: 68 IN

## 2023-08-29 DIAGNOSIS — E89.0 POSTSURGICAL HYPOTHYROIDISM: Primary | ICD-10-CM

## 2023-08-29 LAB
CALCIUM SPEC-SCNC: 8.4 MG/DL (ref 8.6–10.5)
TSH SERPL DL<=0.05 MIU/L-ACNC: 0.43 UIU/ML (ref 0.27–4.2)

## 2023-08-29 PROCEDURE — 84443 ASSAY THYROID STIM HORMONE: CPT | Performed by: NURSE PRACTITIONER

## 2023-08-29 PROCEDURE — 82310 ASSAY OF CALCIUM: CPT | Performed by: NURSE PRACTITIONER

## 2023-08-29 NOTE — ASSESSMENT & PLAN NOTE
-Clinically euthyroid.  -TFT's today with medication adjustment accordingly.  Reminded of proper administration including taking 7 pills per week on an empty stomach with no missed doses, waiting 30-60 minutes prior to other medications or food.  -Follow-up in 3 months.    Will obtain calcium labs as well and adjust medication accordingly.

## 2023-08-29 NOTE — PROGRESS NOTES
Chief Complaint   Patient presents with    Postsurgical hypothyroidism        Referring Provider  No ref. provider found     HPI   Savita Lepe is a 47 y.o. female had concerns including Postsurgical hypothyroidism.   Thyroid Goiter.    She had a total thyroidectomy on 6/30/2023.  She followed up with Dr. Garrett and was noted to have a benign pathology.  She denies any symptoms or changes to her neck.  She is currently taking Calcitriol 0.5 mcg  QD, T4 150 mcg QD, Calcium Carb 500 mg 1000 mg BID.    Birth state: [place]  Previous history of radiation to face/neck: none  Consuming foods high in iodine: none  Family history of thyroid complications: father-thyroidectomy-goiter    TFT's in 10/2022 were in range.  TSH: 0.557  TPO: 9    US Thyroid 10/24/2022  FINDINGS:    Soft tissues:  Unremarkable.  No abscess.  No foreign body.    Left thyroid lobe:  Left lobe of thyroid is 2.2 x 7.2 x 2.1 cm.  Ill-defined mixed cystic and solid nodule left lobe is 2.8 cm.  Smaller  colloid cyst of the left lobe is noted that is approximately 1.4 cm.    Right thyroid lobe:  Right lobe of thyroid is 1.9 x 6.1 x 1.3 cm.  Mixed echogenicity nodule is noted in the lower pole that is 1.2 x 0.9  cm May represent colloid cyst. An adjacent smaller nodule is noted that  is approximately 1.0 cm.    Isthmus:  Isthmus is 4 mm in thickness.    Lymph nodes:  No cervical lymphadenopathy is identified.     IMPRESSION:  1.  Heterogeneous enlarged thyroid gland with multiple cysts and/or  nodules compatible with multinodular thyroid goiter.  2.  Largest index nodules of both lobes as detailed above.  3.  Otherwise unremarkable exam.    The following portions of the patient's history were reviewed and updated as appropriate: allergies, current medications, past family history, past medical history, past social history, past surgical history and problem list.      Past Medical History:   Diagnosis Date    Disease of thyroid gland      Past Surgical  History:   Procedure Laterality Date    BLADDER SURGERY      as a child    THYROIDECTOMY Bilateral 6/30/2023    Procedure: TOTAL THYROIDECTOMY;  Surgeon: Jamey Garrett MD;  Location: [facility];  Service: General;  Laterality: Bilateral;    WISDOM TOOTH EXTRACTION        No family history on file.   Social History     Socioeconomic History    Marital status: Single   Tobacco Use    Smoking status: Former     Packs/day: 1.00     Years: 20.00     Pack years: 20.00     Types: Cigarettes    Smokeless tobacco: Never    Tobacco comments:     Nicotine Patch, trying to quit   Vaping Use    Vaping Use: Former    Substances: Nicotine   Substance and Sexual Activity    Alcohol use: Never    Drug use: Never    Sexual activity: Defer      Allergies   Allergen Reactions    Ibuprofen Anaphylaxis      Current Outpatient Medications on File Prior to Visit   Medication Sig Dispense Refill    calcitriol (ROCALTROL) 0.5 MCG capsule Take 1 capsule by mouth Every 12 (Twelve) Hours for 90 days. 60 capsule 2    calcium carbonate (TUMS) 500 MG chewable tablet Chew 2 tablets 3 (Three) Times a Day for 90 days. 180 tablet 2    docusate sodium (COLACE) 100 MG capsule Take 1 capsule by mouth 2 (Two) Times a Day As Needed for Constipation. 30 capsule 0    levothyroxine (SYNTHROID, LEVOTHROID) 150 MCG tablet Take 1 tablet by mouth Every Morning for 90 days. 30 tablet 2    nicotine (NICODERM CQ) 21 MG/24HR patch Place 1 patch on the skin as directed by provider Daily.       No current facility-administered medications on file prior to visit.      Review of Systems   Constitutional:  Positive for fatigue, unexpected weight gain and unexpected weight loss.   HENT:  Negative for trouble swallowing.    Eyes:  Positive for blurred vision.   Respiratory:  Negative for choking and shortness of breath.    Endocrine: Positive for cold intolerance and heat intolerance.   Skin:  Positive for dry skin.        Hair thinning/hair loss   Neurological:   "Positive for headache.   Psychiatric/Behavioral:  Negative for sleep disturbance.    All other systems reviewed and are negative.     /76 (BP Location: Left arm, Patient Position: Sitting, Cuff Size: Adult)   Pulse 71   Ht 172.7 cm (68\")   Wt 96.5 kg (212 lb 12.8 oz)   SpO2 99%   BMI 32.36 kg/mý      Physical Exam  Vitals reviewed.   Constitutional:       Appearance: Normal appearance.   Eyes:      Extraocular Movements: Extraocular movements intact.   Neck:        Comments: No palpable thyroid tissue.  Cardiovascular:      Rate and Rhythm: Normal rate.   Pulmonary:      Effort: Pulmonary effort is normal.   Skin:     General: Skin is warm.   Neurological:      General: No focal deficit present.      Mental Status: She is alert and oriented to person, place, and time.   Psychiatric:         Mood and Affect: Mood normal.         Behavior: Behavior normal.         Thought Content: Thought content normal.         Judgment: Judgment normal.     Labs/Imaging  CMP  Lab Results   Component Value Date    GLUCOSE 84 10/26/2022    BUN 5 (L) 10/26/2022    CREATININE 0.72 10/26/2022    EGFRIFNONA 85 02/21/2022    BCR 6.9 (L) 10/26/2022    K 4.1 10/26/2022    CO2 27.9 10/26/2022    CALCIUM 9.0 07/24/2023    ALBUMIN 4.10 10/26/2022    LABIL2 2.4 12/18/2014    AST 17 10/26/2022    ALT 10 10/26/2022        CBC w/DIFF   Lab Results   Component Value Date    WBC 10.25 06/23/2023    RBC 4.41 06/23/2023    HGB 13.9 06/23/2023    HCT 41.8 06/23/2023    MCV 94.8 06/23/2023    MCH 31.5 06/23/2023    MCHC 33.3 06/23/2023    RDW 11.9 (L) 06/23/2023    RDWSD 41.7 06/23/2023    MPV 11.0 06/23/2023     06/23/2023    NEUTRORELPCT 78.5 (H) 10/26/2022    LYMPHORELPCT 13.9 (L) 10/26/2022    MONORELPCT 4.4 (L) 10/26/2022    EOSRELPCT 2.7 10/26/2022    BASORELPCT 0.2 10/26/2022    AUTOIGPER 0.3 10/26/2022    NEUTROABS 10.18 (H) 10/26/2022    LYMPHSABS 1.80 10/26/2022    MONOSABS 0.57 10/26/2022    EOSABS 0.35 10/26/2022    BASOSABS " 0.03 10/26/2022    AUTOIGNUM 0.04 10/26/2022    NRBC 0.0 10/26/2022       TSH  Lab Results   Component Value Date    TSH 0.505 07/24/2023    TSH 0.191 (L) 07/17/2023    TSH 1.170 06/20/2023       T4  Lab Results   Component Value Date    FREET4 1.98 (H) 07/24/2023    FREET4 2.00 (H) 07/17/2023    FREET4 1.36 06/20/2023     No results found for: G1QQFOZ    T3  Lab Results   Component Value Date    T3FREE 2.95 02/13/2023     No results found for: M2TXBHX    TRAb  No results found for: TSURCPAB    TPO  Lab Results   Component Value Date    THYROIDAB <8 10/26/2022       No valid procedures specified.    Assessment and Plan    Diagnoses and all orders for this visit:    1. Postsurgical hypothyroidism (Primary)  Assessment & Plan:  -Clinically euthyroid.  -TFT's today with medication adjustment accordingly.  Reminded of proper administration including taking 7 pills per week on an empty stomach with no missed doses, waiting 30-60 minutes prior to other medications or food.  -Follow-up in 3 months.    Will obtain calcium labs as well and adjust medication accordingly.    Orders:  -     Calcium  -     TSH         Return in about 3 months (around 11/29/2023) for Follow-up appointment. The patient was instructed to contact the clinic with any interval questions or concerns.      This document has been electronically signed by ALIA Clinton  August 29, 2023 08:37 EDT   Endocrinology

## 2023-10-02 ENCOUNTER — TELEPHONE (OUTPATIENT)
Dept: ENDOCRINOLOGY | Facility: CLINIC | Age: 48
End: 2023-10-02
Payer: COMMERCIAL

## 2023-10-02 RX ORDER — LEVOTHYROXINE SODIUM 0.12 MG/1
125 TABLET ORAL DAILY
Qty: 30 TABLET | Refills: 2 | Status: SHIPPED | OUTPATIENT
Start: 2023-10-02

## 2023-10-02 NOTE — TELEPHONE ENCOUNTER
Pt called requesting a prescription for Levothyroxine 125 mcg. Pt saw Dr Garrett who prescribed medication. Pt last ov 08/29/23 pt next ov 11/29/23. Please send to [facility]

## 2023-10-11 ENCOUNTER — HOSPITAL ENCOUNTER (OUTPATIENT)
Dept: GENERAL RADIOLOGY | Facility: HOSPITAL | Age: 48
Discharge: HOME | End: 2023-10-11
Admitting: NURSE PRACTITIONER
Payer: COMMERCIAL

## 2023-10-11 ENCOUNTER — TRANSCRIBE ORDERS (OUTPATIENT)
Dept: ADMINISTRATIVE | Facility: HOSPITAL | Age: 48
End: 2023-10-11
Payer: COMMERCIAL

## 2023-10-11 DIAGNOSIS — M25.571 PAIN IN JOINT INVOLVING RIGHT ANKLE AND FOOT: ICD-10-CM

## 2023-10-11 DIAGNOSIS — M25.571 PAIN IN JOINT INVOLVING RIGHT ANKLE AND FOOT: Primary | ICD-10-CM

## 2023-10-11 PROCEDURE — 73610 X-RAY EXAM OF ANKLE: CPT

## 2023-10-24 RX ORDER — LEVOTHYROXINE SODIUM 0.15 MG/1
150 TABLET ORAL DAILY
Qty: 90 TABLET | Refills: 2 | Status: SHIPPED | OUTPATIENT
Start: 2023-10-24

## 2023-10-24 RX ORDER — CALCITRIOL 0.5 UG/1
0.5 CAPSULE, LIQUID FILLED ORAL EVERY 12 HOURS SCHEDULED
Qty: 60 CAPSULE | Refills: 2 | Status: SHIPPED | OUTPATIENT
Start: 2023-10-24 | End: 2024-01-22

## 2023-11-29 ENCOUNTER — OFFICE VISIT (OUTPATIENT)
Dept: ENDOCRINOLOGY | Facility: CLINIC | Age: 48
End: 2023-11-29
Payer: COMMERCIAL

## 2023-11-29 VITALS
DIASTOLIC BLOOD PRESSURE: 84 MMHG | HEIGHT: 68 IN | BODY MASS INDEX: 31.95 KG/M2 | OXYGEN SATURATION: 96 % | SYSTOLIC BLOOD PRESSURE: 117 MMHG | WEIGHT: 210.8 LBS | HEART RATE: 105 BPM

## 2023-11-29 DIAGNOSIS — E89.0 POSTSURGICAL HYPOTHYROIDISM: Primary | ICD-10-CM

## 2023-11-29 LAB
CALCIUM SPEC-SCNC: 9.2 MG/DL (ref 8.6–10.5)
TSH SERPL DL<=0.05 MIU/L-ACNC: 0.26 UIU/ML (ref 0.27–4.2)

## 2023-11-29 PROCEDURE — 84443 ASSAY THYROID STIM HORMONE: CPT | Performed by: NURSE PRACTITIONER

## 2023-11-29 PROCEDURE — 82310 ASSAY OF CALCIUM: CPT | Performed by: NURSE PRACTITIONER

## 2023-11-29 NOTE — PROGRESS NOTES
Chief Complaint   Patient presents with    Hypothyroidism        Referring Provider  No ref. provider found     HPI   Savita Lepe is a 47 y.o. female had concerns including Hypothyroidism.   Thyroid Goiter.    She had a total thyroidectomy on 6/30/2023.  She followed up with Dr. Garrett and was noted to have a benign pathology.  She denies any symptoms or changes to her neck.  She is currently taking Calcitriol 0.5 mcg  BID, T4 150 mcg QD, Calcium Carb 500 mg 1000 mg TID. She has been having some increased leg cramping recently.    Birth state: [place]  Previous history of radiation to face/neck: none  Consuming foods high in iodine: none  Family history of thyroid complications: father-thyroidectomy-goiter    TFT's in 10/2022 were in range.  TSH: 0.557  TPO: 9    US Thyroid 10/24/2022  FINDINGS:    Soft tissues:  Unremarkable.  No abscess.  No foreign body.    Left thyroid lobe:  Left lobe of thyroid is 2.2 x 7.2 x 2.1 cm.  Ill-defined mixed cystic and solid nodule left lobe is 2.8 cm.  Smaller  colloid cyst of the left lobe is noted that is approximately 1.4 cm.    Right thyroid lobe:  Right lobe of thyroid is 1.9 x 6.1 x 1.3 cm.  Mixed echogenicity nodule is noted in the lower pole that is 1.2 x 0.9  cm May represent colloid cyst. An adjacent smaller nodule is noted that  is approximately 1.0 cm.    Isthmus:  Isthmus is 4 mm in thickness.    Lymph nodes:  No cervical lymphadenopathy is identified.     IMPRESSION:  1.  Heterogeneous enlarged thyroid gland with multiple cysts and/or  nodules compatible with multinodular thyroid goiter.  2.  Largest index nodules of both lobes as detailed above.  3.  Otherwise unremarkable exam.    The following portions of the patient's history were reviewed and updated as appropriate: allergies, current medications, past family history, past medical history, past social history, past surgical history and problem list.      Past Medical History:   Diagnosis Date    Disease of  thyroid gland      Past Surgical History:   Procedure Laterality Date    BLADDER SURGERY      as a child    THYROIDECTOMY Bilateral 6/30/2023    Procedure: TOTAL THYROIDECTOMY;  Surgeon: Jamey Garrett MD;  Location: [facility];  Service: General;  Laterality: Bilateral;    WISDOM TOOTH EXTRACTION        History reviewed. No pertinent family history.   Social History     Socioeconomic History    Marital status: Single   Tobacco Use    Smoking status: Former     Packs/day: 1.00     Years: 20.00     Additional pack years: 0.00     Total pack years: 20.00     Types: Cigarettes    Smokeless tobacco: Never    Tobacco comments:     Nicotine Patch, trying to quit   Vaping Use    Vaping Use: Former    Substances: Nicotine   Substance and Sexual Activity    Alcohol use: Never    Drug use: Never    Sexual activity: Defer      Allergies   Allergen Reactions    Ibuprofen Anaphylaxis      Current Outpatient Medications on File Prior to Visit   Medication Sig Dispense Refill    calcitriol (ROCALTROL) 0.5 MCG capsule Take 1 capsule by mouth Every 12 (Twelve) Hours for 90 days. 60 capsule 2    levothyroxine (SYNTHROID, LEVOTHROID) 150 MCG tablet Take 1 tablet by mouth Daily. 90 tablet 2    nicotine (NICODERM CQ) 21 MG/24HR patch Place 1 patch on the skin as directed by provider Daily.      docusate sodium (COLACE) 100 MG capsule Take 1 capsule by mouth 2 (Two) Times a Day As Needed for Constipation. (Patient not taking: Reported on 11/29/2023) 30 capsule 0    levothyroxine (SYNTHROID, LEVOTHROID) 125 MCG tablet Take 1 tablet by mouth Daily. (Patient not taking: Reported on 11/29/2023) 30 tablet 2     No current facility-administered medications on file prior to visit.      Review of Systems   Constitutional:  Positive for fatigue, unexpected weight gain and unexpected weight loss.   HENT:  Negative for trouble swallowing.    Eyes:  Positive for blurred vision.   Respiratory:  Negative for choking and shortness of breath.   "  Endocrine: Positive for cold intolerance and heat intolerance.   Skin:  Positive for dry skin.        Hair thinning/hair loss   Neurological:  Positive for headache.   Psychiatric/Behavioral:  Negative for sleep disturbance.    All other systems reviewed and are negative.    /84 (BP Location: Left arm, Patient Position: Sitting, Cuff Size: Adult)   Pulse 105   Ht 172.7 cm (68\")   Wt 95.6 kg (210 lb 12.8 oz)   SpO2 96%   BMI 32.05 kg/m²      Physical Exam  Vitals reviewed.   Constitutional:       Appearance: Normal appearance.   Eyes:      Extraocular Movements: Extraocular movements intact.   Neck:        Comments: No palpable thyroid tissue.  Cardiovascular:      Rate and Rhythm: Normal rate.   Pulmonary:      Effort: Pulmonary effort is normal.   Skin:     General: Skin is warm.   Neurological:      General: No focal deficit present.      Mental Status: She is alert and oriented to person, place, and time.   Psychiatric:         Mood and Affect: Mood normal.         Behavior: Behavior normal.         Thought Content: Thought content normal.         Judgment: Judgment normal.       Labs/Imaging  CMP  Lab Results   Component Value Date    GLUCOSE 84 10/26/2022    BUN 5 (L) 10/26/2022    CREATININE 0.72 10/26/2022    EGFRIFNONA 85 02/21/2022    BCR 6.9 (L) 10/26/2022    K 4.1 10/26/2022    CO2 27.9 10/26/2022    CALCIUM 8.4 (L) 08/29/2023    ALBUMIN 4.10 10/26/2022    LABIL2 2.4 12/18/2014    AST 17 10/26/2022    ALT 10 10/26/2022        CBC w/DIFF   Lab Results   Component Value Date    WBC 10.25 06/23/2023    RBC 4.41 06/23/2023    HGB 13.9 06/23/2023    HCT 41.8 06/23/2023    MCV 94.8 06/23/2023    MCH 31.5 06/23/2023    MCHC 33.3 06/23/2023    RDW 11.9 (L) 06/23/2023    RDWSD 41.7 06/23/2023    MPV 11.0 06/23/2023     06/23/2023    NEUTRORELPCT 78.5 (H) 10/26/2022    LYMPHORELPCT 13.9 (L) 10/26/2022    MONORELPCT 4.4 (L) 10/26/2022    EOSRELPCT 2.7 10/26/2022    BASORELPCT 0.2 10/26/2022    " "AUTOIGPER 0.3 10/26/2022    NEUTROABS 10.18 (H) 10/26/2022    LYMPHSABS 1.80 10/26/2022    MONOSABS 0.57 10/26/2022    EOSABS 0.35 10/26/2022    BASOSABS 0.03 10/26/2022    AUTOIGNUM 0.04 10/26/2022    NRBC 0.0 10/26/2022       TSH  Lab Results   Component Value Date    TSH 0.434 08/29/2023    TSH 0.505 07/24/2023    TSH 0.191 (L) 07/17/2023       T4  Lab Results   Component Value Date    FREET4 1.98 (H) 07/24/2023    FREET4 2.00 (H) 07/17/2023    FREET4 1.36 06/20/2023     No results found for: \"B8DTZSZ\"    T3  Lab Results   Component Value Date    T3FREE 2.95 02/13/2023     No results found for: \"L9RJSRJ\"    TRAb  No results found for: \"TSURCPAB\"    TPO  Lab Results   Component Value Date    THYROIDAB <8 10/26/2022       No valid procedures specified.    Assessment and Plan    Diagnoses and all orders for this visit:    1. Postsurgical hypothyroidism (Primary)  Assessment & Plan:  -Clinically euthyroid.  -TFT's today with medication adjustment accordingly.  Reminded of proper administration including taking 7 pills per week on an empty stomach with no missed doses, waiting 30-60 minutes prior to other medications or food.  -Follow-up in 3 months.    Will obtain calcium labs as well and adjust medication accordingly.    Orders:  -     TSH  -     Calcium           Return in about 6 months (around 5/29/2024) for Follow-up appointment. The patient was instructed to contact the clinic with any interval questions or concerns.      This document has been electronically signed by ALIA Clinton  November 29, 2023 16:35 EST   Endocrinology  "

## 2023-11-30 RX ORDER — LEVOTHYROXINE SODIUM 137 UG/1
137 TABLET ORAL DAILY
Qty: 90 TABLET | Refills: 2 | Status: SHIPPED | OUTPATIENT
Start: 2023-11-30

## 2024-03-18 ENCOUNTER — TRANSCRIBE ORDERS (OUTPATIENT)
Dept: ADMINISTRATIVE | Facility: HOSPITAL | Age: 49
End: 2024-03-18
Payer: COMMERCIAL

## 2024-03-18 DIAGNOSIS — R07.2 PRECORDIAL PAIN: Primary | ICD-10-CM

## 2024-04-01 ENCOUNTER — HOSPITAL ENCOUNTER (OUTPATIENT)
Dept: CARDIOLOGY | Facility: HOSPITAL | Age: 49
Discharge: HOME | End: 2024-04-01
Admitting: PHYSICIAN ASSISTANT
Payer: COMMERCIAL

## 2024-04-01 DIAGNOSIS — R07.2 PRECORDIAL PAIN: ICD-10-CM

## 2024-04-01 LAB
BH CV STRESS BP STAGE 1: NORMAL
BH CV STRESS BP STAGE 2: NORMAL
BH CV STRESS DURATION MIN STAGE 1: 3
BH CV STRESS DURATION MIN STAGE 2: 3
BH CV STRESS DURATION MIN STAGE 3: 0
BH CV STRESS DURATION SEC STAGE 1: 0
BH CV STRESS DURATION SEC STAGE 2: 0
BH CV STRESS DURATION SEC STAGE 3: 39
BH CV STRESS GRADE STAGE 1: 10
BH CV STRESS GRADE STAGE 2: 12
BH CV STRESS GRADE STAGE 3: 14
BH CV STRESS HR STAGE 1: 141
BH CV STRESS HR STAGE 2: 157
BH CV STRESS HR STAGE 3: 169
BH CV STRESS METS STAGE 1: 5
BH CV STRESS METS STAGE 2: 7.5
BH CV STRESS METS STAGE 3: 10
BH CV STRESS PROTOCOL 1: NORMAL
BH CV STRESS RECOVERY BP: NORMAL MMHG
BH CV STRESS RECOVERY HR: 110 BPM
BH CV STRESS SPEED STAGE 1: 1.7
BH CV STRESS SPEED STAGE 2: 2.5
BH CV STRESS SPEED STAGE 3: 3.4
BH CV STRESS STAGE 1: 1
BH CV STRESS STAGE 2: 2
BH CV STRESS STAGE 3: 3
MAXIMAL PREDICTED HEART RATE: 174 BPM
PERCENT MAX PREDICTED HR: 97.13 %
STRESS BASELINE BP: NORMAL MMHG
STRESS BASELINE HR: 109 BPM
STRESS PERCENT HR: 114 %
STRESS POST ESTIMATED WORKLOAD: 8.9 METS
STRESS POST EXERCISE DUR MIN: 6 MIN
STRESS POST EXERCISE DUR SEC: 39 SEC
STRESS POST PEAK BP: NORMAL MMHG
STRESS POST PEAK HR: 169 BPM
STRESS TARGET HR: 148 BPM

## 2024-04-01 PROCEDURE — 93017 CV STRESS TEST TRACING ONLY: CPT

## 2024-04-01 PROCEDURE — 93018 CV STRESS TEST I&R ONLY: CPT | Performed by: INTERNAL MEDICINE

## 2024-07-16 ENCOUNTER — OFFICE VISIT (OUTPATIENT)
Dept: ENDOCRINOLOGY | Facility: CLINIC | Age: 49
End: 2024-07-16
Payer: COMMERCIAL

## 2024-07-16 VITALS
BODY MASS INDEX: 32.28 KG/M2 | SYSTOLIC BLOOD PRESSURE: 144 MMHG | OXYGEN SATURATION: 100 % | WEIGHT: 213 LBS | DIASTOLIC BLOOD PRESSURE: 95 MMHG | HEART RATE: 99 BPM | HEIGHT: 68 IN

## 2024-07-16 DIAGNOSIS — E89.0 POSTSURGICAL HYPOTHYROIDISM: Primary | ICD-10-CM

## 2024-07-16 LAB
ALBUMIN SERPL-MCNC: 4 G/DL (ref 3.5–5.2)
ALBUMIN/GLOB SERPL: 1.6 G/DL
ALP SERPL-CCNC: 44 U/L (ref 39–117)
ALT SERPL W P-5'-P-CCNC: 8 U/L (ref 1–33)
ANION GAP SERPL CALCULATED.3IONS-SCNC: 13.1 MMOL/L (ref 5–15)
AST SERPL-CCNC: 17 U/L (ref 1–32)
BILIRUB SERPL-MCNC: 0.3 MG/DL (ref 0–1.2)
BUN SERPL-MCNC: 7 MG/DL (ref 6–20)
BUN/CREAT SERPL: 10.4 (ref 7–25)
CALCIUM SPEC-SCNC: 8.8 MG/DL (ref 8.6–10.5)
CHLORIDE SERPL-SCNC: 102 MMOL/L (ref 98–107)
CO2 SERPL-SCNC: 22.9 MMOL/L (ref 22–29)
CREAT SERPL-MCNC: 0.67 MG/DL (ref 0.57–1)
EGFRCR SERPLBLD CKD-EPI 2021: 109.3 ML/MIN/1.73
GLOBULIN UR ELPH-MCNC: 2.5 GM/DL
GLUCOSE SERPL-MCNC: 106 MG/DL (ref 65–99)
POTASSIUM SERPL-SCNC: 3.4 MMOL/L (ref 3.5–5.2)
PROT SERPL-MCNC: 6.5 G/DL (ref 6–8.5)
SODIUM SERPL-SCNC: 138 MMOL/L (ref 136–145)
T4 FREE SERPL-MCNC: 1.85 NG/DL (ref 0.93–1.7)
TSH SERPL DL<=0.05 MIU/L-ACNC: 1.56 UIU/ML (ref 0.27–4.2)

## 2024-07-16 PROCEDURE — 80053 COMPREHEN METABOLIC PANEL: CPT | Performed by: NURSE PRACTITIONER

## 2024-07-16 PROCEDURE — 84443 ASSAY THYROID STIM HORMONE: CPT | Performed by: NURSE PRACTITIONER

## 2024-07-16 PROCEDURE — 84439 ASSAY OF FREE THYROXINE: CPT | Performed by: NURSE PRACTITIONER

## 2024-07-16 RX ORDER — POTASSIUM CHLORIDE 750 MG/1
10 TABLET, EXTENDED RELEASE ORAL DAILY
Qty: 10 TABLET | Refills: 0 | Status: SHIPPED | OUTPATIENT
Start: 2024-07-16 | End: 2025-07-16

## 2024-07-16 RX ORDER — LOSARTAN POTASSIUM AND HYDROCHLOROTHIAZIDE 12.5; 5 MG/1; MG/1
1 TABLET ORAL DAILY
COMMUNITY

## 2024-07-16 NOTE — ASSESSMENT & PLAN NOTE
-Clinically euthyroid.  -TFT's today with medication adjustment accordingly.  Reminded of proper administration including taking 7 pills per week on an empty stomach with no missed doses, waiting 30-60 minutes prior to other medications or food.  -Follow-up in 6 months.    Will obtain calcium labs as well and adjust medication accordingly.

## 2024-07-16 NOTE — PROGRESS NOTES
Chief Complaint   Patient presents with    Postsurgical hypothyroidism        Referring Provider  No ref. provider found     HPI   Savita Lepe is a 48 y.o. female had concerns including Postsurgical hypothyroidism.   Thyroid Goiter.    She reports that she is doing well in relation to her thyroid.  She reports that she has increased stress as multiple of her family members are ill and 1 has recently passed away.  She does report that she was taking calcitriol and was noted to have decreased kidney function.  When she stopped it this resolved.  She has not been taking it in the last month or so.    Thyroid:  She had a total thyroidectomy on 6/30/2023.  She followed up with Dr. Garrett and was noted to have a benign pathology.  She denies any symptoms or changes to her neck.  She is currently taking T4 137 mcg QD, Calcium Carb 500 mg 1000 mg TID.     Birth state: [place]  Previous history of radiation to face/neck: none  Consuming foods high in iodine: none  Family history of thyroid complications: father-thyroidectomy-goiter    TFT's in 10/2022 were in range.  TSH: 0.557  TPO: 9    US Thyroid 10/24/2022  FINDINGS:    Soft tissues:  Unremarkable.  No abscess.  No foreign body.    Left thyroid lobe:  Left lobe of thyroid is 2.2 x 7.2 x 2.1 cm.  Ill-defined mixed cystic and solid nodule left lobe is 2.8 cm.  Smaller  colloid cyst of the left lobe is noted that is approximately 1.4 cm.    Right thyroid lobe:  Right lobe of thyroid is 1.9 x 6.1 x 1.3 cm.  Mixed echogenicity nodule is noted in the lower pole that is 1.2 x 0.9  cm May represent colloid cyst. An adjacent smaller nodule is noted that  is approximately 1.0 cm.    Isthmus:  Isthmus is 4 mm in thickness.    Lymph nodes:  No cervical lymphadenopathy is identified.     IMPRESSION:  1.  Heterogeneous enlarged thyroid gland with multiple cysts and/or  nodules compatible with multinodular thyroid goiter.  2.  Largest index nodules of both lobes as detailed above.  3.   Otherwise unremarkable exam.    The following portions of the patient's history were reviewed and updated as appropriate: allergies, current medications, past family history, past medical history, past social history, past surgical history and problem list.      Past Medical History:   Diagnosis Date    Disease of thyroid gland      Past Surgical History:   Procedure Laterality Date    BLADDER SURGERY      as a child    THYROIDECTOMY Bilateral 6/30/2023    Procedure: TOTAL THYROIDECTOMY;  Surgeon: Jamey Garrett MD;  Location: [facility];  Service: General;  Laterality: Bilateral;    WISDOM TOOTH EXTRACTION        History reviewed. No pertinent family history.   Social History     Socioeconomic History    Marital status: Single   Tobacco Use    Smoking status: Every Day     Current packs/day: 1.00     Average packs/day: 1 pack/day for 20.0 years (20.0 ttl pk-yrs)     Types: Cigarettes    Smokeless tobacco: Never    Tobacco comments:     Nicotine Patch, trying to quit   Vaping Use    Vaping status: Former    Substances: Nicotine   Substance and Sexual Activity    Alcohol use: Never    Drug use: Never    Sexual activity: Defer      Allergies   Allergen Reactions    Ibuprofen Anaphylaxis      Current Outpatient Medications on File Prior to Visit   Medication Sig Dispense Refill    levothyroxine (SYNTHROID, LEVOTHROID) 137 MCG tablet Take 1 tablet by mouth Daily. 90 tablet 2    losartan-hydrochlorothiazide (HYZAAR) 50-12.5 MG per tablet Take 1 tablet by mouth Daily.      calcitriol (ROCALTROL) 0.5 MCG capsule Take 1 capsule by mouth Every 12 (Twelve) Hours for 90 days. 60 capsule 2    docusate sodium (COLACE) 100 MG capsule Take 1 capsule by mouth 2 (Two) Times a Day As Needed for Constipation. (Patient not taking: Reported on 11/29/2023) 30 capsule 0    nicotine (NICODERM CQ) 21 MG/24HR patch Place 1 patch on the skin as directed by provider Daily. (Patient not taking: Reported on 7/16/2024)       No current  "facility-administered medications on file prior to visit.      Review of Systems   Constitutional:  Positive for fatigue, unexpected weight gain and unexpected weight loss.   HENT:  Negative for trouble swallowing.    Eyes:  Positive for blurred vision.   Respiratory:  Negative for choking and shortness of breath.    Endocrine: Positive for cold intolerance and heat intolerance.   Skin:  Positive for dry skin.        Hair thinning/hair loss   Neurological:  Positive for headache.   Psychiatric/Behavioral:  Negative for sleep disturbance.    All other systems reviewed and are negative.    /95 (BP Location: Right arm, Patient Position: Sitting, Cuff Size: Adult)   Pulse 99   Ht 172.7 cm (68\")   Wt 96.6 kg (213 lb)   SpO2 100%   BMI 32.39 kg/m²      Physical Exam  Vitals reviewed.   Constitutional:       Appearance: Normal appearance.   Eyes:      Extraocular Movements: Extraocular movements intact.   Neck:        Comments: No palpable thyroid tissue.  Cardiovascular:      Rate and Rhythm: Normal rate.   Pulmonary:      Effort: Pulmonary effort is normal.   Skin:     General: Skin is warm.   Neurological:      General: No focal deficit present.      Mental Status: She is alert and oriented to person, place, and time.   Psychiatric:         Mood and Affect: Mood normal.         Behavior: Behavior normal.         Thought Content: Thought content normal.         Judgment: Judgment normal.       Labs/Imaging  CMP  Lab Results   Component Value Date    GLUCOSE 84 10/26/2022    BUN 5 (L) 10/26/2022    CREATININE 0.72 10/26/2022    EGFRIFNONA 85 02/21/2022    BCR 6.9 (L) 10/26/2022    K 4.1 10/26/2022    CO2 27.9 10/26/2022    CALCIUM 9.2 11/29/2023    ALBUMIN 4.10 10/26/2022    LABIL2 2.4 12/18/2014    AST 17 10/26/2022    ALT 10 10/26/2022        CBC w/DIFF   Lab Results   Component Value Date    WBC 10.25 06/23/2023    RBC 4.41 06/23/2023    HGB 13.9 06/23/2023    HCT 41.8 06/23/2023    MCV 94.8 06/23/2023    " "MCH 31.5 06/23/2023    MCHC 33.3 06/23/2023    RDW 11.9 (L) 06/23/2023    RDWSD 41.7 06/23/2023    MPV 11.0 06/23/2023     06/23/2023    NEUTRORELPCT 78.5 (H) 10/26/2022    LYMPHORELPCT 13.9 (L) 10/26/2022    MONORELPCT 4.4 (L) 10/26/2022    EOSRELPCT 2.7 10/26/2022    BASORELPCT 0.2 10/26/2022    AUTOIGPER 0.3 10/26/2022    NEUTROABS 10.18 (H) 10/26/2022    LYMPHSABS 1.80 10/26/2022    MONOSABS 0.57 10/26/2022    EOSABS 0.35 10/26/2022    BASOSABS 0.03 10/26/2022    AUTOIGNUM 0.04 10/26/2022    NRBC 0.0 10/26/2022       TSH  Lab Results   Component Value Date    TSH 0.259 (L) 11/29/2023    TSH 0.434 08/29/2023    TSH 0.505 07/24/2023       T4  Lab Results   Component Value Date    FREET4 1.98 (H) 07/24/2023    FREET4 2.00 (H) 07/17/2023    FREET4 1.36 06/20/2023     No results found for: \"[id number]\"    T3  Lab Results   Component Value Date    T3FREE 2.95 02/13/2023     No results found for: \"[id number]\"    TRAb  No results found for: \"TSURCPAB\"    TPO  Lab Results   Component Value Date    THYROIDAB <8 10/26/2022       No valid procedures specified.    Assessment and Plan    Diagnoses and all orders for this visit:    1. Postsurgical hypothyroidism (Primary)  Assessment & Plan:  -Clinically euthyroid.  -TFT's today with medication adjustment accordingly.  Reminded of proper administration including taking 7 pills per week on an empty stomach with no missed doses, waiting 30-60 minutes prior to other medications or food.  -Follow-up in 6 months.    Will obtain calcium labs as well and adjust medication accordingly.    Orders:  -     TSH  -     T4, free  -     Comprehensive Metabolic Panel             Return in about 6 months (around 1/16/2025) for Follow-up appointment. The patient was instructed to contact the clinic with any interval questions or concerns.      This document has been electronically signed by ALIA Clinton  July 16, 2024 09:16 EDT   Endocrinology    Please note that portions of this " document were completed with a voice recognition program. Efforts were made to edit the dictations, but occasionally words are mis-transcribed.

## 2024-09-09 RX ORDER — LEVOTHYROXINE SODIUM 137 UG/1
TABLET ORAL
Qty: 90 TABLET | Refills: 2 | Status: SHIPPED | OUTPATIENT
Start: 2024-09-09

## 2024-09-19 ENCOUNTER — TRANSCRIBE ORDERS (OUTPATIENT)
Dept: ADMINISTRATIVE | Facility: HOSPITAL | Age: 49
End: 2024-09-19
Payer: COMMERCIAL

## 2024-09-19 DIAGNOSIS — Z12.31 ENCOUNTER FOR SCREENING MAMMOGRAM FOR MALIGNANT NEOPLASM OF BREAST: Primary | ICD-10-CM

## 2024-10-18 ENCOUNTER — HOSPITAL ENCOUNTER (OUTPATIENT)
Dept: MAMMOGRAPHY | Facility: HOSPITAL | Age: 49
Discharge: HOME | End: 2024-10-18
Payer: COMMERCIAL

## 2024-10-18 DIAGNOSIS — Z12.31 ENCOUNTER FOR SCREENING MAMMOGRAM FOR MALIGNANT NEOPLASM OF BREAST: ICD-10-CM

## 2024-10-18 PROCEDURE — 77063 BREAST TOMOSYNTHESIS BI: CPT

## 2024-10-18 PROCEDURE — 77067 SCR MAMMO BI INCL CAD: CPT

## 2025-01-16 ENCOUNTER — OFFICE VISIT (OUTPATIENT)
Dept: ENDOCRINOLOGY | Facility: CLINIC | Age: 50
End: 2025-01-16
Payer: COMMERCIAL

## 2025-01-16 VITALS
DIASTOLIC BLOOD PRESSURE: 79 MMHG | WEIGHT: 215 LBS | SYSTOLIC BLOOD PRESSURE: 131 MMHG | BODY MASS INDEX: 32.69 KG/M2 | OXYGEN SATURATION: 98 % | HEART RATE: 96 BPM

## 2025-01-16 DIAGNOSIS — E89.0 POSTSURGICAL HYPOTHYROIDISM: Primary | ICD-10-CM

## 2025-01-16 LAB
CALCIUM SPEC-SCNC: 8.8 MG/DL (ref 8.6–10.5)
T3FREE SERPL-MCNC: 2.87 PG/ML (ref 2–4.4)
T4 FREE SERPL-MCNC: 1.4 NG/DL (ref 0.92–1.68)
TSH SERPL DL<=0.05 MIU/L-ACNC: 1.12 UIU/ML (ref 0.27–4.2)

## 2025-01-16 PROCEDURE — 84481 FREE ASSAY (FT-3): CPT | Performed by: NURSE PRACTITIONER

## 2025-01-16 PROCEDURE — 82310 ASSAY OF CALCIUM: CPT | Performed by: NURSE PRACTITIONER

## 2025-01-16 PROCEDURE — 84443 ASSAY THYROID STIM HORMONE: CPT | Performed by: NURSE PRACTITIONER

## 2025-01-16 PROCEDURE — 84439 ASSAY OF FREE THYROXINE: CPT | Performed by: NURSE PRACTITIONER

## 2025-01-16 RX ORDER — CALCIUM CARBONATE 1177 MG/1
1177 BAR, CHEWABLE ORAL 3 TIMES DAILY
COMMUNITY

## 2025-01-16 NOTE — PROGRESS NOTES
Chief Complaint   Patient presents with    Follow-up     thyroid        Referring Provider  No ref. provider found     HPI   Savita Lepe is a 49 y.o. female had concerns including Follow-up (thyroid).   Postoperative Hypothyroidism.    She reports that she is doing well in relation to her thyroid.  She has had a couple times this past week of increase nausea with vomiting and isn't sure that she has been able to keep her meds down. She denies any hypo/hyper symptoms at this time.  She does report that she is having increased fatigue.  She is currently taking T4 137 mcg QD, Calcium Carb Chew 1177 mg TID. She denies any compressive s/sx's. She is not taking any conflicting medication.    Thyroid:  She had a total thyroidectomy on 6/30/2023.  She followed up with Dr. Garrett and was noted to have a benign pathology.  She denies any symptoms or changes to her neck.  She is currently taking T4 regularly.     Birth state: [place]  Previous history of radiation to face/neck: none  Consuming foods high in iodine: none  Family history of thyroid complications: father-thyroidectomy-goiter    Total thyroidectomy by Dr Garrett in 06/30/2023 with the following pathology:  Final Diagnosis   1.  THYROID, LEFT LOBE, THYROID LOBECTOMY:  Benign thyroid tissue with benign follicular nodules consistent with multinodular goiter.     2.  THYROID, RIGHT LOBE, THYROID LOBECTOMY:  Benign thyroid tissue with benign follicular nodules and focal chronic inflammation.  Benign perithyroidal lymph node.   Electronically signed by Heladio Lucero MD on 7/5/2023 at 1539   Gross Description    1. Thyroid.  Received in formalin labeled left thyroid lobe is a 33 g, 8 x 0.8 x 2.6 cm unoriented thyroid lobe.  The capsule is roughened and dark brown with focal surgical disruption on the presumed anterior aspect.  The presumed anterior aspect is inked blue and the presumed posterior aspect is inked black.  Sectioning reveals diffusely nodular cut  surfaces, with nodules ranging from 0.4 to 2.7 cm in greatest dimension.  The majority of the nodules have a gelatinous appearance.  One of the nodules has scattered hemorrhage.  No other gross lesions are identified.  The remainder of the thyroid parenchyma is unremarkable.  Representative sections are submitted in blocks 1A-1H.  2. Thyroid.  Received in formalin labeled right thyroid lobe is an 11.1 g, is a 5.1 x 3 x 1.4 cm unoriented thyroid lobe.  The capsule is gray-brown and roughened.  The presumed anterior aspect is inked green and the presumed posterior aspect is inked black.  Sectioning reveals scattered gelatinous nodules at the mid to inferior aspect up to 1 cm in greatest dimension.  No other suspicious lesions are grossly identified.  The remaining thyroid parenchyma is unremarkable.  Representative sections are submitted in blocks 2A-2E.  LDP       TFT's in 10/2022 were in range.  TSH: 0.557  TPO: 9    US Thyroid 10/24/2022  FINDINGS:    Soft tissues:  Unremarkable.  No abscess.  No foreign body.    Left thyroid lobe:  Left lobe of thyroid is 2.2 x 7.2 x 2.1 cm.  Ill-defined mixed cystic and solid nodule left lobe is 2.8 cm.  Smaller  colloid cyst of the left lobe is noted that is approximately 1.4 cm.    Right thyroid lobe:  Right lobe of thyroid is 1.9 x 6.1 x 1.3 cm.  Mixed echogenicity nodule is noted in the lower pole that is 1.2 x 0.9  cm May represent colloid cyst. An adjacent smaller nodule is noted that  is approximately 1.0 cm.    Isthmus:  Isthmus is 4 mm in thickness.    Lymph nodes:  No cervical lymphadenopathy is identified.     IMPRESSION:  1.  Heterogeneous enlarged thyroid gland with multiple cysts and/or  nodules compatible with multinodular thyroid goiter.  2.  Largest index nodules of both lobes as detailed above.  3.  Otherwise unremarkable exam.    The following portions of the patient's history were reviewed and updated as appropriate: allergies, current medications, past family  history, past medical history, past social history, past surgical history and problem list.      Past Medical History:   Diagnosis Date    Disease of thyroid gland      Past Surgical History:   Procedure Laterality Date    BLADDER SURGERY      as a child    THYROIDECTOMY Bilateral 6/30/2023    Procedure: TOTAL THYROIDECTOMY;  Surgeon: Jamey Garrett MD;  Location: [facility];  Service: General;  Laterality: Bilateral;    WISDOM TOOTH EXTRACTION        Family History   Problem Relation Age of Onset    Breast cancer Neg Hx       Social History     Socioeconomic History    Marital status: Single   Tobacco Use    Smoking status: Every Day     Current packs/day: 1.00     Average packs/day: 1 pack/day for 20.0 years (20.0 ttl pk-yrs)     Types: Cigarettes    Smokeless tobacco: Never    Tobacco comments:     Nicotine Patch, trying to quit   Vaping Use    Vaping status: Former    Substances: Nicotine   Substance and Sexual Activity    Alcohol use: Never    Drug use: Never    Sexual activity: Defer      Allergies   Allergen Reactions    Ibuprofen Anaphylaxis      Current Outpatient Medications on File Prior to Visit   Medication Sig Dispense Refill    levothyroxine (SYNTHROID, LEVOTHROID) 137 MCG tablet TAKE ONE TABLET BY MOUTH EVERY MORNING ON AN EMPTY STOMACH FOR THYROID THERAPY 90 tablet 2    losartan-hydrochlorothiazide (HYZAAR) 50-12.5 MG per tablet Take 1 tablet by mouth Daily.      potassium chloride (KLOR-CON M10) 10 MEQ CR tablet Take 1 tablet by mouth Daily. 10 tablet 0    Calcium Carbonate Antacid (Tums Chewy Delights) 1177 MG chewable tablet Chew 1 tablet 3 times a day.      docusate sodium (COLACE) 100 MG capsule Take 1 capsule by mouth 2 (Two) Times a Day As Needed for Constipation. (Patient not taking: Reported on 1/16/2025) 30 capsule 0    nicotine (NICODERM CQ) 21 MG/24HR patch Place 1 patch on the skin as directed by provider Daily. (Patient not taking: Reported on 7/16/2024)      [DISCONTINUED]  calcitriol (ROCALTROL) 0.5 MCG capsule Take 1 capsule by mouth Every 12 (Twelve) Hours for 90 days. 60 capsule 2     No current facility-administered medications on file prior to visit.      Review of Systems   Constitutional:  Positive for fatigue, unexpected weight gain and unexpected weight loss.   HENT:  Negative for trouble swallowing.    Eyes:  Positive for blurred vision.   Respiratory:  Negative for choking and shortness of breath.    Endocrine: Positive for cold intolerance and heat intolerance.   Skin:  Positive for dry skin.        Hair thinning/hair loss   Neurological:  Positive for headache.   Psychiatric/Behavioral:  Negative for sleep disturbance.    All other systems reviewed and are negative.    /79 (BP Location: Right arm, Patient Position: Sitting, Cuff Size: Small Adult)   Pulse 96   Wt 97.5 kg (215 lb)   SpO2 98%   BMI 32.69 kg/m²      Physical Exam  Vitals reviewed.   Constitutional:       Appearance: Normal appearance.   Eyes:      Extraocular Movements: Extraocular movements intact.   Neck:        Comments: No palpable thyroid tissue.  Cardiovascular:      Rate and Rhythm: Normal rate.   Pulmonary:      Effort: Pulmonary effort is normal.   Skin:     General: Skin is warm.   Neurological:      General: No focal deficit present.      Mental Status: She is alert and oriented to person, place, and time.   Psychiatric:         Mood and Affect: Mood normal.         Behavior: Behavior normal.         Thought Content: Thought content normal.         Judgment: Judgment normal.       Labs/Imaging  CMP  Lab Results   Component Value Date    GLUCOSE 106 (H) 07/16/2024    BUN 7 07/16/2024    CREATININE 0.67 07/16/2024    EGFRIFNONA 85 02/21/2022    BCR 10.4 07/16/2024    K 3.4 (L) 07/16/2024    CO2 22.9 07/16/2024    CALCIUM 8.8 07/16/2024    ALBUMIN 4.0 07/16/2024    LABIL2 2.4 12/18/2014    AST 17 07/16/2024    ALT 8 07/16/2024        CBC w/DIFF   Lab Results   Component Value Date    WBC  "10.25 06/23/2023    RBC 4.41 06/23/2023    HGB 13.9 06/23/2023    HCT 41.8 06/23/2023    MCV 94.8 06/23/2023    MCH 31.5 06/23/2023    MCHC 33.3 06/23/2023    RDW 11.9 (L) 06/23/2023    RDWSD 41.7 06/23/2023    MPV 11.0 06/23/2023     06/23/2023    NEUTRORELPCT 78.5 (H) 10/26/2022    LYMPHORELPCT 13.9 (L) 10/26/2022    MONORELPCT 4.4 (L) 10/26/2022    EOSRELPCT 2.7 10/26/2022    BASORELPCT 0.2 10/26/2022    AUTOIGPER 0.3 10/26/2022    NEUTROABS 10.18 (H) 10/26/2022    LYMPHSABS 1.80 10/26/2022    MONOSABS 0.57 10/26/2022    EOSABS 0.35 10/26/2022    BASOSABS 0.03 10/26/2022    AUTOIGNUM 0.04 10/26/2022    NRBC 0.0 10/26/2022       TSH  Lab Results   Component Value Date    TSH 1.560 07/16/2024    TSH 0.259 (L) 11/29/2023    TSH 0.434 08/29/2023       T4  Lab Results   Component Value Date    FREET4 1.85 (H) 07/16/2024    FREET4 1.98 (H) 07/24/2023    FREET4 2.00 (H) 07/17/2023     No results found for: \"T5GYXIM\"    T3  Lab Results   Component Value Date    T3FREE 2.95 02/13/2023     No results found for: \"Z8LPDCH\"    TRAb  No results found for: \"TSURCPAB\"    TPO  Lab Results   Component Value Date    THYROIDAB <8 10/26/2022       No valid procedures specified.    Assessment and Plan    Diagnoses and all orders for this visit:    1. Postsurgical hypothyroidism (Primary)  Assessment & Plan:  -Clinically euthyroid.  -TFT's today with medication adjustment accordingly.    -Reminded of proper administration including taking 7 pills per week on an empty stomach with no missed doses, waiting 30-60 minutes prior to other medications or food.  -Follow-up in 6 months.    Will obtain calcium labs as well and adjust medication accordingly.    Orders:  -     TSH  -     T4, free  -     T3, Free  -     Calcium        Return in about 6 months (around 7/16/2025) for Follow-up appointment. The patient was instructed to contact the clinic with any interval questions or concerns.      This document has been electronically signed " by Alexa Mcgarry, ALIA  January 16, 2025 09:42 EST   Endocrinology    Please note that portions of this document were completed with a voice recognition program. Efforts were made to edit the dictations, but occasionally words are mis-transcribed.

## 2025-05-05 ENCOUNTER — TRANSCRIBE ORDERS (OUTPATIENT)
Dept: ADMINISTRATIVE | Facility: HOSPITAL | Age: 50
End: 2025-05-05
Payer: COMMERCIAL

## 2025-05-05 DIAGNOSIS — R10.84 ABDOMINAL PAIN, GENERALIZED: Primary | ICD-10-CM

## 2025-05-14 ENCOUNTER — OFFICE VISIT (OUTPATIENT)
Dept: GASTROENTEROLOGY | Facility: CLINIC | Age: 50
End: 2025-05-14
Payer: COMMERCIAL

## 2025-05-14 VITALS
BODY MASS INDEX: 30.92 KG/M2 | HEIGHT: 68 IN | HEART RATE: 114 BPM | SYSTOLIC BLOOD PRESSURE: 134 MMHG | DIASTOLIC BLOOD PRESSURE: 97 MMHG | WEIGHT: 204 LBS

## 2025-05-14 DIAGNOSIS — K21.9 GASTROESOPHAGEAL REFLUX DISEASE, UNSPECIFIED WHETHER ESOPHAGITIS PRESENT: Primary | ICD-10-CM

## 2025-05-14 DIAGNOSIS — Z12.11 ENCOUNTER FOR SCREENING FOR MALIGNANT NEOPLASM OF COLON: ICD-10-CM

## 2025-05-14 DIAGNOSIS — R11.14 BILIOUS VOMITING WITH NAUSEA: ICD-10-CM

## 2025-05-14 DIAGNOSIS — R13.19 ESOPHAGEAL DYSPHAGIA: ICD-10-CM

## 2025-05-14 RX ORDER — PANTOPRAZOLE SODIUM 40 MG/1
40 TABLET, DELAYED RELEASE ORAL DAILY
Qty: 30 TABLET | Refills: 5 | Status: SHIPPED | OUTPATIENT
Start: 2025-05-14

## 2025-05-14 RX ORDER — POLYETHYLENE GLYCOL 3350 17 G/17G
510 POWDER, FOR SOLUTION ORAL TAKE AS DIRECTED
Qty: 510 G | Refills: 0 | Status: SHIPPED | OUTPATIENT
Start: 2025-05-14

## 2025-05-14 RX ORDER — BISACODYL 5 MG/1
20 TABLET, DELAYED RELEASE ORAL ONCE
Qty: 4 TABLET | Refills: 0 | Status: SHIPPED | OUTPATIENT
Start: 2025-05-14 | End: 2025-05-14

## 2025-05-14 NOTE — PROGRESS NOTES
DATE OF CONSULTATION:  5/14/2025    REASON FOR REFERRAL: Nausea and vomiting    REFERRING PHYSICIAN:  JOSE Burk    CHIEF COMPLAINT:  Chief Complaint   Patient presents with    Nausea    Vomiting       HISTORY OF PRESENT ILLNESS:   Savita Lepe is a very pleasant 49 y.o. female who is being seen today at the request of JOSE Burk for evaluation and treatment of nausea and vomiting. Ms. Lepe reports struggling with intermittent episodes of nausea and vomiting for the past ~3 years.  Patient reports episodes will occur 1-2 times per month and can last for~3 to 4 days.  Patient reports having several episodes of nausea and vomiting of bile.  She has previously taken Zofran as needed which she does not find helpful.  She has associated burning in her throat/chest and regurgitation.  She also has epigastric pain.  Patient reports symptoms are exacerbated by stress or anxiety with work or caring for her sister who has several medical problems and lives with her at home.  She also feels spicy and greasy foods exacerbate symptoms which she tries to avoid.  Patient reports smoking but is currently trying to cut back/quit.  At present, she reports smoking~5 cigarettes/day.  She has lost 11 pounds since January 2025.  She has very occasional dysphagia with solids and liquids which she says occurs randomly.  Patient reports eating slowly and taking small bites which she finds helpful.  Of note, she retains her gallbladder.  She had previous ultrasound of the abdomen in October 2022 which was unremarkable.  Her PCP recently ordered a HIDA scan to further evaluate which has not been completed yet.  She is not currently taking any medications for acid reflux.  She reports her bowels move well.  She denies melena, hematochezia or rectal bleeding.  She reports family history of colon cancer in her maternal grandfather.  She denies having any first-degree relatives with colon cancer.  Patient reports her  mother had gallbladder cancer.  She has no other complaints today.    PAST MEDICAL HISTORY:  Past Medical History:   Diagnosis Date    Disease of thyroid gland        PAST SURGICAL HISTORY:  Past Surgical History:   Procedure Laterality Date    BLADDER SURGERY      as a child    THYROIDECTOMY Bilateral 6/30/2023    Procedure: TOTAL THYROIDECTOMY;  Surgeon: Jamey Garrett MD;  [facility];  Service: General;  Laterality: Bilateral;    WISDOM TOOTH EXTRACTION         FAMILY HISTORY:  Family History   Problem Relation Age of Onset    Other (gall bladder cancer) Mother     Colon cancer Paternal Grandfather     Stomach cancer Paternal Grandfather     Breast cancer Neg Hx        SOCIAL HISTORY:  Social History     Socioeconomic History    Marital status: Single   Tobacco Use    Smoking status: Every Day     Current packs/day: 1.00     Average packs/day: 1 pack/day for 20.0 years (20.0 ttl pk-yrs)     Types: Cigarettes    Smokeless tobacco: Never    Tobacco comments:     Nicotine Patch, trying to quit   Vaping Use    Vaping status: Former   Substance and Sexual Activity    Alcohol use: Never    Drug use: Never    Sexual activity: Defer       MEDICATIONS:  The current medication list was reviewed in the EMR    Current Outpatient Medications:     Calcium Carbonate Antacid (Tums Chewy Delights) 1177 MG chewable tablet, Chew 1 tablet 3 times a day., Disp: , Rfl:     levothyroxine (SYNTHROID, LEVOTHROID) 137 MCG tablet, TAKE ONE TABLET BY MOUTH EVERY MORNING ON AN EMPTY STOMACH FOR THYROID THERAPY, Disp: 90 tablet, Rfl: 2    losartan-hydrochlorothiazide (HYZAAR) 50-12.5 MG per tablet, Take 1 tablet by mouth Daily., Disp: , Rfl:     ALLERGIES:    Allergies   Allergen Reactions    Ibuprofen Anaphylaxis         REVIEW OF SYSTEMS:    A comprehensive 14 point review of systems was performed.  Significant findings as mentioned above.  All other systems reviewed and are negative.        Physical Exam   Vital Signs: BP  "134/97 (BP Location: Left arm, Patient Position: Sitting, Cuff Size: Large Adult)   Pulse 114   Ht 172.7 cm (68\")   Wt 92.5 kg (204 lb)   BMI 31.02 kg/m²    General: Well developed, well nourished, alert and oriented x 3, in no acute distress.   Head: ATNC   Eyes: PERRL, No evidence of conjunctivitis.   Nose: No nasal discharge.   Mouth: Oral mucosal membranes moist. No oral ulceration or hemorrhages.   Neck: Neck supple. No thyromegaly. No JVD.   Lungs: Clear in all fields to A&P without rales, rhonchi or wheezing.   Heart: S1, S2. Regular rate and rhythm. No murmurs, rubs, or gallops.   Abdomen: Soft. Bowel sounds are normoactive. Nontender with palpation.  Extremities: No cyanosis or edema.   Neurologic: Grossly non-focal exam      IMAGING:     10/26/2022    Study Result    Narrative & Impression   EXAM:    US Abdomen Limited, Right Upper Quadrant     EXAM DATE:    10/26/2022 8:25 AM     CLINICAL HISTORY:    R10.11; R10.11-Right upper quadrant pain     TECHNIQUE:    Real-time ultrasound of the right upper quadrant with image  documentation.     COMPARISON:    No relevant prior studies available.     FINDINGS:    Liver:  Liver is homogeneous with no focal lesions identified.  Normal  patency and directional flow of the portal and hepatic veins.  No  intrahepatic bile duct dilation.    Gallbladder:  The gallbladder is unremarkable with no wall thickening  or shadowing stones.    Common bile duct:  The common bile duct is 3 mm in diameter and is  within normal limits.  No stones.  No dilation.    Pancreas:  Visualized portions of the pancreas are unremarkable.     IMPRESSION:    Unremarkable exam.     This report was finalized on 10/26/2022 9:10 AM by Dr. David Matos MD.          RECENT LABS:  Lab Results   Component Value Date    WBC 10.25 06/23/2023    HGB 13.9 06/23/2023    HCT 41.8 06/23/2023    MCV 94.8 06/23/2023    RDW 11.9 (L) 06/23/2023     06/23/2023    NEUTRORELPCT 78.5 (H) 10/26/2022    " LYMPHORELPCT 13.9 (L) 10/26/2022    MONORELPCT 4.4 (L) 10/26/2022    EOSRELPCT 2.7 10/26/2022    BASORELPCT 0.2 10/26/2022    NEUTROABS 10.18 (H) 10/26/2022    LYMPHSABS 1.80 10/26/2022       Lab Results   Component Value Date     07/16/2024    K 3.4 (L) 07/16/2024    CO2 22.9 07/16/2024     07/16/2024    BUN 7 07/16/2024    CREATININE 0.67 07/16/2024    EGFRIFNONA 85 02/21/2022    GLUCOSE 106 (H) 07/16/2024    CALCIUM 8.8 01/16/2025    ALKPHOS 44 07/16/2024    AST 17 07/16/2024    ALT 8 07/16/2024    BILITOT 0.3 07/16/2024    ALBUMIN 4.0 07/16/2024    PROTEINTOT 6.5 07/16/2024     ASSESSMENT & PLAN:  Savita Lepe is a very pleasant 49 y.o. female with    1.  GERD/epigastric pain:  2.  Nausea and vomiting (bile):  3.  Dysphagia (intermittent):    - Recommended trial of PPI therapy.  Will start Protonix 40 mg p.o. daily.  Rx provided.  We also discussed lifestyle changes including smoking cessation and important dietary modifications, limiting triggers such as caffeine, chocolate, spicy foods, acidic foods, fried/greasy foods, food with high fat content and carbonated beverages.  Discussed with patient how increased stress and anxiety can contribute to GERD symptoms as well.  -Recommended to proceed with HIDA scan as ordered by PCP.  -Will have patient return to clinic in 10 to 12 weeks for symptom check.  If symptoms have not improved, will likely proceed with EGD to further evaluate.    4.  Screening colonoscopy:  - Patient has not had a screening colonoscopy which was recommended.  We discussed risks/benefits and patient is agreeable to proceed.  Will schedule.      The patient was in agreement with the plan and all questions were answered to her satisfaction.     Thank you so much for allowing us to participate in the care of Savita Lepe . Please do not hesitate to contact us with any questions or concerns.             Electronically Signed by: Ana Boston APRN , May 14, 2025  09:45 EDT       CC:   JOSE Burk, JOSE Rae

## 2025-05-15 ENCOUNTER — PATIENT ROUNDING (BHMG ONLY) (OUTPATIENT)
Dept: GASTROENTEROLOGY | Facility: CLINIC | Age: 50
End: 2025-05-15
Payer: COMMERCIAL

## 2025-06-23 RX ORDER — LEVOTHYROXINE SODIUM 137 UG/1
TABLET ORAL
Qty: 90 TABLET | Refills: 0 | Status: SHIPPED | OUTPATIENT
Start: 2025-06-23

## 2025-07-21 ENCOUNTER — TELEPHONE (OUTPATIENT)
Dept: GASTROENTEROLOGY | Facility: CLINIC | Age: 50
End: 2025-07-21
Payer: COMMERCIAL

## 2025-07-21 NOTE — TELEPHONE ENCOUNTER
Caller: Savita Lepe    Relationship: Self    Best call back number:   Telephone Information:   Mobile [phone]     What was the call regarding: PT CALLED STATING SHE IS UNSURE ON HOW TO GET PREP FOR UPCOMING PROCEDURE. PLEASE ADVISE.

## 2025-07-21 NOTE — TELEPHONE ENCOUNTER
I called and spoke with patient and explained prep was sent in by provider to her pharmacy and that she can also get it OTC. Pt is going to pharmacy. Thank you

## 2025-07-23 ENCOUNTER — ANESTHESIA (OUTPATIENT)
Dept: PERIOP | Facility: HOSPITAL | Age: 50
End: 2025-07-23
Payer: COMMERCIAL

## 2025-07-23 ENCOUNTER — HOSPITAL ENCOUNTER (OUTPATIENT)
Facility: HOSPITAL | Age: 50
Setting detail: HOSPITAL OUTPATIENT SURGERY
Discharge: HOME | End: 2025-07-23
Attending: INTERNAL MEDICINE | Admitting: INTERNAL MEDICINE
Payer: COMMERCIAL

## 2025-07-23 ENCOUNTER — ANESTHESIA EVENT (OUTPATIENT)
Dept: PERIOP | Facility: HOSPITAL | Age: 50
End: 2025-07-23
Payer: COMMERCIAL

## 2025-07-23 VITALS
BODY MASS INDEX: 30.31 KG/M2 | HEIGHT: 68 IN | SYSTOLIC BLOOD PRESSURE: 112 MMHG | WEIGHT: 200 LBS | HEART RATE: 67 BPM | RESPIRATION RATE: 16 BRPM | TEMPERATURE: 98.5 F | DIASTOLIC BLOOD PRESSURE: 73 MMHG | OXYGEN SATURATION: 97 %

## 2025-07-23 DIAGNOSIS — Z12.11 ENCOUNTER FOR SCREENING FOR MALIGNANT NEOPLASM OF COLON: ICD-10-CM

## 2025-07-23 LAB
B-HCG UR QL: NEGATIVE
EXPIRATION DATE: NORMAL
INTERNAL NEGATIVE CONTROL: NORMAL
INTERNAL POSITIVE CONTROL: NORMAL
Lab: NORMAL

## 2025-07-23 PROCEDURE — 25010000002 PROPOFOL 200 MG/20ML EMULSION: Performed by: NURSE ANESTHETIST, CERTIFIED REGISTERED

## 2025-07-23 PROCEDURE — 81025 URINE PREGNANCY TEST: CPT | Performed by: ANESTHESIOLOGY

## 2025-07-23 PROCEDURE — 25010000002 MIDAZOLAM PER 1 MG: Performed by: NURSE ANESTHETIST, CERTIFIED REGISTERED

## 2025-07-23 PROCEDURE — 45378 DIAGNOSTIC COLONOSCOPY: CPT | Performed by: INTERNAL MEDICINE

## 2025-07-23 PROCEDURE — 25810000003 LACTATED RINGERS PER 1000 ML: Performed by: ANESTHESIOLOGY

## 2025-07-23 RX ORDER — SODIUM CHLORIDE 0.9 % (FLUSH) 0.9 %
10 SYRINGE (ML) INJECTION AS NEEDED
Status: DISCONTINUED | OUTPATIENT
Start: 2025-07-23 | End: 2025-07-23 | Stop reason: HOSPADM

## 2025-07-23 RX ORDER — MEPERIDINE HYDROCHLORIDE 25 MG/ML
12.5 INJECTION INTRAMUSCULAR; INTRAVENOUS; SUBCUTANEOUS
Status: DISCONTINUED | OUTPATIENT
Start: 2025-07-23 | End: 2025-07-23 | Stop reason: HOSPADM

## 2025-07-23 RX ORDER — PROPOFOL 10 MG/ML
INJECTION, EMULSION INTRAVENOUS AS NEEDED
Status: DISCONTINUED | OUTPATIENT
Start: 2025-07-23 | End: 2025-07-23 | Stop reason: SURG

## 2025-07-23 RX ORDER — MIDAZOLAM HYDROCHLORIDE 1 MG/ML
INJECTION, SOLUTION INTRAMUSCULAR; INTRAVENOUS AS NEEDED
Status: DISCONTINUED | OUTPATIENT
Start: 2025-07-23 | End: 2025-07-23 | Stop reason: SURG

## 2025-07-23 RX ORDER — FENTANYL CITRATE 50 UG/ML
50 INJECTION, SOLUTION INTRAMUSCULAR; INTRAVENOUS
Status: DISCONTINUED | OUTPATIENT
Start: 2025-07-23 | End: 2025-07-23 | Stop reason: HOSPADM

## 2025-07-23 RX ORDER — SODIUM CHLORIDE, SODIUM LACTATE, POTASSIUM CHLORIDE, CALCIUM CHLORIDE 600; 310; 30; 20 MG/100ML; MG/100ML; MG/100ML; MG/100ML
125 INJECTION, SOLUTION INTRAVENOUS ONCE
Status: COMPLETED | OUTPATIENT
Start: 2025-07-23 | End: 2025-07-23

## 2025-07-23 RX ORDER — OXYCODONE AND ACETAMINOPHEN 5; 325 MG/1; MG/1
1 TABLET ORAL ONCE AS NEEDED
Status: DISCONTINUED | OUTPATIENT
Start: 2025-07-23 | End: 2025-07-23 | Stop reason: HOSPADM

## 2025-07-23 RX ORDER — IPRATROPIUM BROMIDE AND ALBUTEROL SULFATE 2.5; .5 MG/3ML; MG/3ML
3 SOLUTION RESPIRATORY (INHALATION) ONCE AS NEEDED
Status: DISCONTINUED | OUTPATIENT
Start: 2025-07-23 | End: 2025-07-23 | Stop reason: HOSPADM

## 2025-07-23 RX ORDER — SODIUM CHLORIDE 9 MG/ML
40 INJECTION, SOLUTION INTRAVENOUS AS NEEDED
Status: DISCONTINUED | OUTPATIENT
Start: 2025-07-23 | End: 2025-07-23 | Stop reason: HOSPADM

## 2025-07-23 RX ORDER — MIDAZOLAM HYDROCHLORIDE 1 MG/ML
1 INJECTION, SOLUTION INTRAMUSCULAR; INTRAVENOUS
Status: DISCONTINUED | OUTPATIENT
Start: 2025-07-23 | End: 2025-07-23 | Stop reason: HOSPADM

## 2025-07-23 RX ORDER — SODIUM CHLORIDE, SODIUM LACTATE, POTASSIUM CHLORIDE, CALCIUM CHLORIDE 600; 310; 30; 20 MG/100ML; MG/100ML; MG/100ML; MG/100ML
100 INJECTION, SOLUTION INTRAVENOUS ONCE AS NEEDED
Status: DISCONTINUED | OUTPATIENT
Start: 2025-07-23 | End: 2025-07-23 | Stop reason: HOSPADM

## 2025-07-23 RX ORDER — ONDANSETRON 2 MG/ML
4 INJECTION INTRAMUSCULAR; INTRAVENOUS AS NEEDED
Status: DISCONTINUED | OUTPATIENT
Start: 2025-07-23 | End: 2025-07-23 | Stop reason: HOSPADM

## 2025-07-23 RX ORDER — SODIUM CHLORIDE 0.9 % (FLUSH) 0.9 %
10 SYRINGE (ML) INJECTION EVERY 12 HOURS SCHEDULED
Status: DISCONTINUED | OUTPATIENT
Start: 2025-07-23 | End: 2025-07-23 | Stop reason: HOSPADM

## 2025-07-23 RX ADMIN — PROPOFOL 100 MG: 10 INJECTION, EMULSION INTRAVENOUS at 11:22

## 2025-07-23 RX ADMIN — PROPOFOL 100 MG: 10 INJECTION, EMULSION INTRAVENOUS at 11:18

## 2025-07-23 RX ADMIN — MIDAZOLAM 2 MG: 1 INJECTION INTRAMUSCULAR; INTRAVENOUS at 11:11

## 2025-07-23 RX ADMIN — SODIUM CHLORIDE, POTASSIUM CHLORIDE, SODIUM LACTATE AND CALCIUM CHLORIDE: 600; 310; 30; 20 INJECTION, SOLUTION INTRAVENOUS at 11:11

## 2025-07-23 RX ADMIN — PROPOFOL 100 MG: 10 INJECTION, EMULSION INTRAVENOUS at 11:14

## 2025-07-23 NOTE — ANESTHESIA POSTPROCEDURE EVALUATION
Patient: Savita Lepe    Procedure Summary       Date: 07/23/25 Room / Location: [facility] OR  /  [facility] OR    Anesthesia Start: 1111 Anesthesia Stop: 1133    Procedure: COLONOSCOPY Diagnosis:       Encounter for screening for malignant neoplasm of colon      (Encounter for screening for malignant neoplasm of colon [Z12.11])    Surgeons: Alexandrea Burnett MD Provider: Kofi Mclaughlin MD    Anesthesia Type: general ASA Status: 2            Anesthesia Type: general    Vitals  Vitals Value Taken Time   /73 07/23/25 12:05   Temp 98.5 °F (36.9 °C) 07/23/25 11:35   Pulse 67 07/23/25 12:05   Resp 16 07/23/25 12:05   SpO2 97 % 07/23/25 12:05           Post Anesthesia Care and Evaluation    Patient location during evaluation: PHASE II  Patient participation: complete - patient participated  Level of consciousness: awake and alert  Pain score: 1  Pain management: adequate    Airway patency: patent  Anesthetic complications: No anesthetic complications  PONV Status: controlled  Cardiovascular status: acceptable  Respiratory status: acceptable  Hydration status: acceptable

## 2025-07-23 NOTE — H&P
[facility] HOSPITALIST HISTORY AND PHYSICAL    Patient Identification:  Name:  Savita Lepe  Age:  49 y.o.  Sex:  female  :  1975  MRN:  [id number]   Visit Number:  [id number]  Primary Care Physician:  Dara Guevara PA       Chief complaint: Screening colonoscopy     History of presenting illness:  49 y.o. female who presents today for screening colonoscopy. This will be her 1st colonoscopy. She notes a family history of colon cancer in her maternal grandfather. No 1st degree relatives with colon cancer. She denies unintentional weight loss, abdominal pain, change in bowel habits, melena, hematochezia, or BRBPR.     Review of Systems   Constitutional:  Negative for unexpected weight change.   HENT: Negative.     Respiratory: Negative.     Cardiovascular: Negative.    Gastrointestinal:  Negative for abdominal distention, abdominal pain, anal bleeding, blood in stool, constipation, diarrhea and rectal pain.   All other systems reviewed and are negative.       Past Medical History:   Diagnosis Date    Anxiety     Disease of thyroid gland     Hypertension      Past Surgical History:   Procedure Laterality Date    BLADDER SURGERY      as a child    THYROIDECTOMY Bilateral 2023    Procedure: TOTAL THYROIDECTOMY;  Surgeon: Jamey Garrett MD;  Location: [facility];  Service: General;  Laterality: Bilateral;    WISDOM TOOTH EXTRACTION       Family History   Problem Relation Age of Onset    Other (gall bladder cancer) Mother     Colon cancer Paternal Grandfather     Stomach cancer Paternal Grandfather     Breast cancer Neg Hx      Social History     Socioeconomic History    Marital status: Single   Tobacco Use    Smoking status: Every Day     Current packs/day: 1.00     Average packs/day: 1 pack/day for 20.0 years (20.0 ttl pk-yrs)     Types: Cigarettes    Smokeless tobacco: Never    Tobacco comments:     Nicotine Patch, trying to quit   Vaping Use    Vaping status: Former  "  Substance and Sexual Activity    Alcohol use: Never    Drug use: Never    Sexual activity: Defer       Allergies:  Ibuprofen    Prior to Admission Medications       Prescriptions Last Dose Informant Patient Reported? Taking?    levothyroxine (SYNTHROID, LEVOTHROID) 137 MCG tablet 7/23/2025  No Yes    take one tablet by mouth every morning on an empty stomach for thyroid therapy    losartan-hydrochlorothiazide (HYZAAR) 50-12.5 MG per tablet 7/23/2025  Yes Yes    Take 1 tablet by mouth Daily.    pantoprazole (PROTONIX) 40 MG EC tablet 7/23/2025  No Yes    Take 1 tablet by mouth Daily.    polyethylene glycol (MIRALAX) 17 GM/SCOOP powder 7/22/2025  No Yes    Take 510 g by mouth Take As Directed. Place 15 cap fulls of powder in 32 oz of liquid of choice at 4:00 and 10:00 PM          Hospital Scheduled Meds:  lactated ringers, 125 mL/hr, Intravenous, Once  sodium chloride, 10 mL, Intravenous, Q12H           Vital Signs:  Temp:  [97.4 °F (36.3 °C)] 97.4 °F (36.3 °C)  Heart Rate:  [78] 78  Resp:  [18] 18  BP: (131)/(85) 131/85      07/23/25  1033   Weight: 90.7 kg (200 lb)     Body mass index is 30.41 kg/m².    Physical Exam:  Constitutional:  Alert and oriented. Well developed and well nourished, in no acute distress.  HENT:  Head: Normocephalic and atraumatic.  Mouth:  Moist mucous membranes.  OP clear, mmm  Eyes:  Conjunctivae and EOM are normal.  Pupils are equal, round, and reactive to light.  No scleral icterus.  Neck:  Neck supple.  No JVD present.    Cardiovascular:  RRR, no MRG.  Pulmonary/Chest:  CTAB, unlabored.   Abdominal:  Soft.  Bowel sounds are normal.  No distension and no tenderness.   Psychiatric:  Normal mood and affect.  Behavior is normal.  Judgment and thought content normal.                     Invalid input(s): \"PROT\"CrCl cannot be calculated (Patient's most recent lab result is older than the maximum 30 days allowed.).  No results found for: \"AMMONIA\"          No results found for: \"HGBA1C\"  Lab " "Results   Component Value Date    TSH 1.120 01/16/2025    FREET4 1.40 01/16/2025     Lab Results   Component Value Date    PREGTESTUR Negative 02/21/2022     Pain Management Panel           No data to display              No results found for: \"BLOODCX\"  No results found for: \"URINECX\"  No results found for: \"WOUNDCX\"  No results found for: \"STOOLCX\"        Imaging Results (Last 7 Days)       ** No results found for the last 168 hours. **              Assessment and Plan:    1. Proceed with screening colonoscopy.     Yvette Thakkar PA-C  07/23/25  10:54 EDT    "

## 2025-07-23 NOTE — ANESTHESIA PREPROCEDURE EVALUATION
Anesthesia Evaluation     Patient summary reviewed and Nursing notes reviewed   NPO Solid Status: > 8 hours  NPO Liquid Status: > 2 hours           Airway   Mallampati: II  TM distance: >3 FB  Neck ROM: full  No difficulty expected  Dental    (+) poor dentition        Pulmonary    (+) a smoker Current, Smoked day of surgery,rhonchi  (-) asthma, shortness of breath, recent URI, sleep apnea  Cardiovascular   Exercise tolerance: good (4-7 METS)    ECG reviewed  Rhythm: regular  Rate: normal    (+) hypertension  (-) past MI, dysrhythmias, angina (ECG last year for fall w/u   -not admitted ), cardiac stents    ROS comment: ECG from 2022 NSR ( doubttul Ant Q waves )    Neuro/Psych  (+) psychiatric history Anxiety  (-) seizures, CVA  GI/Hepatic/Renal/Endo    (+) obesity, thyroid problem hypothyroidism and thyroid nodules    Musculoskeletal (-) negative ROS    Abdominal   (+) obese    Abdomen: soft.   Substance History - negative use     OB/GYN          Other - negative ROS       ROS/Med Hx Other:   Normal sinus rhythm  Cannot rule out Anterior infarct , age undetermined  Abnormal ECG  No previous ECGs available  Confirmed by Syd Khalil ([id number]) on 2/22/2022 11:57:40 AM        Phys Exam Other: Very poor dentition is getting them fixed asap after this surgery   Many missing uppers   Most of rest are severely careous   Risks of  tooth damage and loss were discussed               Anesthesia Plan    ASA 2     general     (Rajput -mild tracheal deviation   Teeth care )  intravenous induction     Anesthetic plan, risks, benefits, and alternatives have been provided, discussed and informed consent has been obtained with: patient.  Pre-procedure education provided  Use of blood products discussed with  Consented to blood products.    Plan discussed with CRNA.      CODE STATUS:

## (undated) DEVICE — DEFENDO AIR WATER SUCTION AND BIOPSY VALVE KIT: Brand: DEFENDO AIR/WATER/SUCTION AND BIOPSY VALVE

## (undated) DEVICE — Device

## (undated) DEVICE — ENDOGATOR TUBING FOR ENDOGATOR EGP-100 IRRIGATION PUMP,OLYMPUS OFP PUMP, OLYMPUS AFU-100 PUMP AND ERBE EIP2 PUMP: Brand: ENDOGATOR

## (undated) DEVICE — CONN Y IRR DISP 1P/U

## (undated) DEVICE — ENDOGATOR AUXILIARY WATER JET CONNECTOR: Brand: ENDOGATOR